# Patient Record
Sex: FEMALE | Race: BLACK OR AFRICAN AMERICAN | HISPANIC OR LATINO
[De-identification: names, ages, dates, MRNs, and addresses within clinical notes are randomized per-mention and may not be internally consistent; named-entity substitution may affect disease eponyms.]

---

## 2018-05-23 ENCOUNTER — APPOINTMENT (OUTPATIENT)
Dept: HUMAN REPRODUCTION | Facility: CLINIC | Age: 38
End: 2018-05-23
Payer: COMMERCIAL

## 2018-05-23 PROCEDURE — 99203 OFFICE O/P NEW LOW 30 MIN: CPT | Mod: 25

## 2018-05-23 PROCEDURE — 36415 COLL VENOUS BLD VENIPUNCTURE: CPT

## 2018-05-30 ENCOUNTER — APPOINTMENT (OUTPATIENT)
Dept: HUMAN REPRODUCTION | Facility: CLINIC | Age: 38
End: 2018-05-30

## 2019-10-15 ENCOUNTER — APPOINTMENT (OUTPATIENT)
Dept: ENDOCRINOLOGY | Facility: CLINIC | Age: 39
End: 2019-10-15
Payer: COMMERCIAL

## 2019-10-15 VITALS
OXYGEN SATURATION: 99 % | SYSTOLIC BLOOD PRESSURE: 90 MMHG | HEIGHT: 56.3 IN | DIASTOLIC BLOOD PRESSURE: 60 MMHG | HEART RATE: 73 BPM | WEIGHT: 109 LBS | BODY MASS INDEX: 24.18 KG/M2

## 2019-10-15 PROCEDURE — 99204 OFFICE O/P NEW MOD 45 MIN: CPT

## 2019-10-20 NOTE — PHYSICAL EXAM
[Well Developed] : well developed [No Respiratory Distress] : no respiratory distress [EOMI] : extra ocular movement intact [No Acute Distress] : no acute distress [Normal Rate and Effort] : normal respiratory rhythm and effort [Clear to Auscultation] : lungs were clear to auscultation bilaterally [Normal S1, S2] : normal S1 and S2 [Normal Rate] : heart rate was normal  [No Motor Deficits] : the motor exam was normal [Normal Gait] : normal gait [Regular Rhythm] : with a regular rhythm [No Tremors] : no tremors [Normal Insight/Judgement] : insight and judgment were intact [Oriented x3] : oriented to person, place, and time [Normal Affect] : the affect was normal

## 2019-10-20 NOTE — ASSESSMENT
[Levothyroxine] : The patient was instructed to take Levothyroxine on an empty stomach, separate from vitamins, and wait at least 30 minutes before eating [FreeTextEntry1] : 39 years old \par PMO- premature ovarian failure\par hypothyroidism \par mosiac's naqvi \par \par \par #hypothyroid \par check TFTS in 4 weeks (she has been on 88mcg for 2 weeks) full effect on TSH to be seen in total 4-6 weeks\par to take on empty stomach\par check antibodies\par \par \par #POF\par check estradiol, LH and FSH \par ovairan antibody\par referral to OBGYN and also to repro obgyn with Dr. Figueroa to discuss fertility options\par \par \par #bone health \par discuss bone health at next visit\par check Vit D today \par with low estrogen state, will affect BMD, optimize jeanne and vit D \par \par \par \par

## 2019-10-20 NOTE — HISTORY OF PRESENT ILLNESS
[FreeTextEntry1] : 39 years old \par PMO- premature ovarian failure\par hypothyroidism \par mosiac's naqvi \par \par \par GYN- to establish care \par well woman exam last year \par low bone denisty -osteopenia - was told to take vit D \par \par 1.thyroid:\par since 24- hypoThyroid \par was on nature throid, TSH 90 in may \par levoxyl 88mcg empty stomach \par 2 weeks \par we discussed at length need for evidence based medicine with levothyroxine and thyroid replacement as well as for other supplements \par explained for bone health vit D and calcium have been studies but vit K for exmaple (which she is taking can lead to issues with bleeding for example) \par explained okay to take MVI and eat well balanced meal \par \par Last TSH in may 2019 \par TSH 90 \par Free T4 0.6\par \par thyroid US: reviewed normal \par \par \par she is also on vit D, magnesium, vit K, \par \par pt with difficulty with fertility \par \par \par PSH\par none \par \par \par Alllergies\par none \par \par \par \par FHX \par mom : hypothythyroid \par \par \par \par SH \par admintrative assistant\par  \par no illicits\par

## 2019-10-21 LAB
25(OH)D3 SERPL-MCNC: 37.3 NG/ML
CHOLEST SERPL-MCNC: 244 MG/DL
CHOLEST/HDLC SERPL: 2.8 RATIO
ESTIMATED AVERAGE GLUCOSE: 114 MG/DL
ESTRADIOL SERPL-MCNC: 8 PG/ML
FSH SERPL-MCNC: 126 IU/L
HBA1C MFR BLD HPLC: 5.6 %
HDLC SERPL-MCNC: 87 MG/DL
LDLC SERPL CALC-MCNC: 148 MG/DL
LH SERPL-ACNC: 56 IU/L
OVARY AB SER QL IF: NEGATIVE
T4 FREE SERPL-MCNC: 1.3 NG/DL
T4 SERPL-MCNC: 6.4 UG/DL
THYROGLOB AB SERPL-ACNC: 2967 IU/ML
THYROPEROXIDASE AB SERPL IA-ACNC: 85.8 IU/ML
TRIGL SERPL-MCNC: 46 MG/DL
TSH SERPL-ACNC: 13.7 UIU/ML
VIT B12 SERPL-MCNC: 1185 PG/ML

## 2019-12-10 ENCOUNTER — LABORATORY RESULT (OUTPATIENT)
Age: 39
End: 2019-12-10

## 2019-12-10 ENCOUNTER — APPOINTMENT (OUTPATIENT)
Dept: ENDOCRINOLOGY | Facility: CLINIC | Age: 39
End: 2019-12-10
Payer: COMMERCIAL

## 2019-12-10 VITALS
HEART RATE: 79 BPM | HEIGHT: 56.3 IN | DIASTOLIC BLOOD PRESSURE: 70 MMHG | SYSTOLIC BLOOD PRESSURE: 110 MMHG | WEIGHT: 107 LBS | BODY MASS INDEX: 23.73 KG/M2 | OXYGEN SATURATION: 98 %

## 2019-12-10 PROCEDURE — 99215 OFFICE O/P EST HI 40 MIN: CPT

## 2019-12-10 NOTE — PHYSICAL EXAM
[No Acute Distress] : no acute distress [Well Developed] : well developed [EOMI] : extra ocular movement intact [Clear to Auscultation] : lungs were clear to auscultation bilaterally [Normal Rate and Effort] : normal respiratory rhythm and effort [No Respiratory Distress] : no respiratory distress [Normal S1, S2] : normal S1 and S2 [Normal Rate] : heart rate was normal  [Regular Rhythm] : with a regular rhythm [Normal Gait] : normal gait [No Motor Deficits] : the motor exam was normal [Oriented x3] : oriented to person, place, and time [Normal Insight/Judgement] : insight and judgment were intact [No Tremors] : no tremors [Normal Affect] : the affect was normal

## 2019-12-11 LAB
ALBUMIN SERPL ELPH-MCNC: 4.8 G/DL
ALP BLD-CCNC: 75 U/L
ALT SERPL-CCNC: 19 U/L
ANION GAP SERPL CALC-SCNC: 15 MMOL/L
AST SERPL-CCNC: 20 U/L
BILIRUB SERPL-MCNC: 0.3 MG/DL
BUN SERPL-MCNC: 11 MG/DL
CALCIUM SERPL-MCNC: 10.2 MG/DL
CHLORIDE SERPL-SCNC: 100 MMOL/L
CO2 SERPL-SCNC: 27 MMOL/L
CREAT SERPL-MCNC: 0.78 MG/DL
GLUCOSE SERPL-MCNC: 87 MG/DL
MAGNESIUM SERPL-MCNC: 2.2 MG/DL
POTASSIUM SERPL-SCNC: 4.3 MMOL/L
PROT SERPL-MCNC: 6.9 G/DL
SODIUM SERPL-SCNC: 142 MMOL/L
TSH SERPL-ACNC: 10.9 UIU/ML

## 2019-12-11 NOTE — ASSESSMENT
[Levothyroxine] : The patient was instructed to take Levothyroxine on an empty stomach, separate from vitamins, and wait at least 30 minutes before eating [FreeTextEntry1] : 39 years old \par POF premature ovarian failure\par hypothyroidism \par mosiac's naqvi \par HLD\par ?osteopenia/low bone density in the past \par \par \par long discussion today as well as last visit to discuss that many supplements that she is taking are not evidience based and studied and that may be more harmful to her health (iodiene for example can lead to flututating thyroid levels and hypothyroidism and will be harder to adjust levoT doses)\par \par #hypothyroid \par + antibodies\par check TSH\par long discussion with the pt to avoid supplements such as iodeine which can hinder and affect her thyroid levels \par \par #POF\par ovairan antibody negative\par referral to OBGYN and also to repro obgyn with Dr. Figueroa to discuss fertility options\par d/w pt she will need estrogen replacment tx to help with bone health and will also need to be followed with GYN while on replacement to follow\par \par \par #bone health  \par with low estrogen state, will affect BMD, optimize jeanne and vit D \par we discussed calcium 600mg thru diet and 600mg thru supplement \par continue vitamin D 1000 units \par bone density to assess her bone health\par \par \par \par #HLD\par diet and exercise\par low fat diet\par repeat Lipid panel today

## 2019-12-11 NOTE — HISTORY OF PRESENT ILLNESS
[FreeTextEntry1] : 39 years old \par PMO- premature ovarian failure\par hypothyroidism \par moswendy's naqvi -low estrogen elevated FSH/LH, consistent with POF\par \par here for followup, here with  whom she is okay to have in the room\par \par 12/10- went [place] [facility] \par \par \par 1.thyroid:\par since 24- hypoThyroid \par +antobodies \par was on nature throid, TSH 90 in may \par levoxyl 88mcg empty stomach --> TSH 13--> at that time on LevoT 88mcg, we asked to repeat TFTS in total of 4 weeks of [id number]\par we discussed at length need for evidence based medicine with levothyroxine and thyroid replacement as well as for other supplements \par explained okay to take MVI and eat well balanced meal \par  TSH in may 2019 \par TSH 90 \par Free T4 0.6\par \par thyroid US: reviewed normal \par \par Now on 88 mcg levothyroxine empty stomach TSH 13 in oct \par she is also taking iodiene, we had a LONG discussion about SE and risks of taking excess iodenie and this can in fact lead to hypothyroidism as well via wolfchaikoff effect\par we discussed at length need for evidence based medicine with levothyroxine and thyroid replacement as well as for other supplements \par explained okay to take MVI and eat well balanced meal and to be careful with taking supplments that are not FDA approved \par \par \par #HLD\par LDL\par  \par cholesterol 244\par no fried foods \par + cheese\par \par \par \par #vitamin D\par level was 37\par vitamin D 1000 units \par calcium - greens \par takes magnesium \par was told she had osteopenia in the past \par \par \par take \par vtiamin D \par prenatal \par magneium \par potassium \par DHEAS \par iodenie \par \par \par pt with difficulty with fertility \par \par \par \par PSH\par none \par \par \par Alllergies\par none \par \par \par \par FHX \par mom : hypothythyroid \par \par \par \par SH \par admintrative assistant\par  \par no illicits\par

## 2019-12-12 ENCOUNTER — CLINICAL ADVICE (OUTPATIENT)
Age: 39
End: 2019-12-12

## 2020-01-27 ENCOUNTER — APPOINTMENT (OUTPATIENT)
Dept: HUMAN REPRODUCTION | Facility: CLINIC | Age: 40
End: 2020-01-27
Payer: COMMERCIAL

## 2020-01-27 PROCEDURE — 99205 OFFICE O/P NEW HI 60 MIN: CPT | Mod: 25

## 2020-01-27 PROCEDURE — 76830 TRANSVAGINAL US NON-OB: CPT

## 2020-01-28 ENCOUNTER — APPOINTMENT (OUTPATIENT)
Dept: HUMAN REPRODUCTION | Facility: CLINIC | Age: 40
End: 2020-01-28

## 2020-01-30 ENCOUNTER — APPOINTMENT (OUTPATIENT)
Dept: RADIOLOGY | Facility: IMAGING CENTER | Age: 40
End: 2020-01-30

## 2020-01-30 ENCOUNTER — OUTPATIENT (OUTPATIENT)
Dept: OUTPATIENT SERVICES | Facility: HOSPITAL | Age: 40
LOS: 1 days | End: 2020-01-30
Payer: COMMERCIAL

## 2020-01-30 DIAGNOSIS — Z00.8 ENCOUNTER FOR OTHER GENERAL EXAMINATION: ICD-10-CM

## 2020-01-30 PROCEDURE — 77080 DXA BONE DENSITY AXIAL: CPT | Mod: 26

## 2020-01-30 PROCEDURE — 77080 DXA BONE DENSITY AXIAL: CPT

## 2020-02-10 ENCOUNTER — APPOINTMENT (OUTPATIENT)
Dept: PEDIATRIC MEDICAL GENETICS | Facility: CLINIC | Age: 40
End: 2020-02-10

## 2020-03-02 ENCOUNTER — APPOINTMENT (OUTPATIENT)
Dept: ANTEPARTUM | Facility: CLINIC | Age: 40
End: 2020-03-02

## 2020-03-02 ENCOUNTER — APPOINTMENT (OUTPATIENT)
Dept: MATERNAL FETAL MEDICINE | Facility: CLINIC | Age: 40
End: 2020-03-02

## 2020-03-09 ENCOUNTER — APPOINTMENT (OUTPATIENT)
Dept: CV DIAGNOSITCS | Facility: HOSPITAL | Age: 40
End: 2020-03-09

## 2020-03-09 ENCOUNTER — OUTPATIENT (OUTPATIENT)
Dept: OUTPATIENT SERVICES | Facility: HOSPITAL | Age: 40
LOS: 1 days | End: 2020-03-09
Payer: COMMERCIAL

## 2020-03-09 DIAGNOSIS — I25.10 ATHEROSCLEROTIC HEART DISEASE OF NATIVE CORONARY ARTERY WITHOUT ANGINA PECTORIS: ICD-10-CM

## 2020-03-09 PROCEDURE — 93306 TTE W/DOPPLER COMPLETE: CPT

## 2020-03-09 PROCEDURE — 93306 TTE W/DOPPLER COMPLETE: CPT | Mod: 26

## 2020-03-10 ENCOUNTER — APPOINTMENT (OUTPATIENT)
Dept: HUMAN REPRODUCTION | Facility: CLINIC | Age: 40
End: 2020-03-10

## 2020-03-11 ENCOUNTER — APPOINTMENT (OUTPATIENT)
Dept: ENDOCRINOLOGY | Facility: CLINIC | Age: 40
End: 2020-03-11
Payer: COMMERCIAL

## 2020-03-11 VITALS
WEIGHT: 106 LBS | DIASTOLIC BLOOD PRESSURE: 72 MMHG | HEART RATE: 77 BPM | SYSTOLIC BLOOD PRESSURE: 110 MMHG | HEIGHT: 56.3 IN | BODY MASS INDEX: 23.51 KG/M2 | OXYGEN SATURATION: 98 %

## 2020-03-11 PROCEDURE — 99214 OFFICE O/P EST MOD 30 MIN: CPT

## 2020-03-11 NOTE — PHYSICAL EXAM
[No Acute Distress] : no acute distress [Well Developed] : well developed [EOMI] : extra ocular movement intact [No Respiratory Distress] : no respiratory distress [Normal Rate and Effort] : normal respiratory rhythm and effort [Clear to Auscultation] : lungs were clear to auscultation bilaterally [Normal Rate] : heart rate was normal  [Normal S1, S2] : normal S1 and S2 [Regular Rhythm] : with a regular rhythm [Normal Gait] : normal gait [No Motor Deficits] : the motor exam was normal [No Tremors] : no tremors [Oriented x3] : oriented to person, place, and time [Normal Insight/Judgement] : insight and judgment were intact [Normal Affect] : the affect was normal

## 2020-03-18 RX ORDER — LEVOTHYROXINE SODIUM 0.1 MG/1
100 TABLET ORAL
Qty: 30 | Refills: 2 | Status: COMPLETED | COMMUNITY
End: 2020-03-18

## 2020-03-18 NOTE — ASSESSMENT
[Levothyroxine] : The patient was instructed to take Levothyroxine on an empty stomach, separate from vitamins, and wait at least 30 minutes before eating [FreeTextEntry1] : 39 years old \par POF premature ovarian failure\par hypothyroidism \par mosiac's naqvi \par HLD\par ?osteopenia/low bone density in the past \par \par \par needs to have PCP- will refer \par start back vitamin D \par need to get bone density results to us \par \par \par #hypothyroid \par + antibodies\par check TSH\par long discussion with the pt to avoid supplements such as iodeine which can hinder and affect her thyroid levels\par continue 100mcg daily for now, \par \par #POF\par ovairan antibody negative\par referral to OBGYN \par d/w pt she will need estrogen replacment tx to help with bone health and will also need to be followed with GYN while on replacement to follow (may need llower doses given age and not OCP doses)\par \par \par #bone health  \par with low estrogen state, will affect BMD, optimize jeanne and vit D \par we discussed calcium 600mg thru diet and 600mg thru supplement \par continue vitamin D 1000 units \par bone density to assess her bone health- she had this done recently. i have asked the pt to get us the results as well \par \par \par \par #HLD\par diet and exercise\par low fat diet\par repeat Lipid panel today

## 2020-03-18 NOTE — HISTORY OF PRESENT ILLNESS
[FreeTextEntry1] : 39 years old \par PMO- premature ovarian failure\par hypothyroidism \par floyd's naqvi -low estrogen elevated FSH/LH, consistent with POF\par \par 3/11- she recently lost her GM, is upset about this.\par \par \par 1.thyroid:\par since 24- hypoThyroid \par +antobodies \par was on nature throid, TSH 90 in may \par levoxyl 88mcg empty stomach --> TSH 13--> at that time on LevoT 88mcg, we asked to repeat TFTS in total of 4 weeks of [id number]\par we discussed at length need for evidence based medicine with levothyroxine and thyroid replacement as well as for other supplements \par explained okay to take MVI and eat well balanced meal \par TSH in may 2019 \par TSH 90 \par Free T4 0.6\par \par thyroid US: reviewed normal \par \par than on 88 mcg levothyroxine empty stomach TSH 13 in oct ---->10\par she is also taking iodiene, we had a LONG discussion about SE and risks of taking excess iodenie and this can in fact lead to hypothyroidism as well via wolfchaikoff effect\par we had discussed at length need for evidence based medicine with levothyroxine and thyroid replacement as well as for other supplements \par explained okay to take MVI and eat well balanced meal and to be careful with taking supplments that are not FDA approved \par \par \par she was than increased to 100mcg - last TSH 2.71 \par stopped all the replacements \par \par #HLD\par LDL\par  \par cholesterol 244\par no fried foods \par + cheese\par \par \par \par #vitamin D\par level was 37-\par vitamin D 1000 units (stopped it)\par bone health \par add thru the diet - calcium \par calcium - greens \par takes magnesium \par was told she had osteopenia in the past \par she had a bone density done- d/w pt to have this sent to us to review \par \par \par \par \par \par PSH\par none \par \par \par Alllergies\par none \par \par \par \par FHX \par mom : hypothythyroid \par \par \par \par SH \par admintrative assistant\par  \par no illicits\par

## 2020-04-01 LAB
CHOLEST SERPL-MCNC: 237 MG/DL
CHOLEST/HDLC SERPL: 2.7 RATIO
ESTIMATED AVERAGE GLUCOSE: 114 MG/DL
HBA1C MFR BLD HPLC: 5.6 %
HDLC SERPL-MCNC: 87 MG/DL
LDLC SERPL CALC-MCNC: 132 MG/DL
TRIGL SERPL-MCNC: 87 MG/DL
TSH SERPL-ACNC: 1.25 UIU/ML

## 2020-05-28 ENCOUNTER — FORM ENCOUNTER (OUTPATIENT)
Age: 40
End: 2020-05-28

## 2020-05-29 ENCOUNTER — RESULT REVIEW (OUTPATIENT)
Age: 40
End: 2020-05-29

## 2020-05-29 ENCOUNTER — APPOINTMENT (OUTPATIENT)
Dept: OBGYN | Facility: CLINIC | Age: 40
End: 2020-05-29
Payer: COMMERCIAL

## 2020-05-29 PROCEDURE — 99213 OFFICE O/P EST LOW 20 MIN: CPT | Mod: 25

## 2020-05-29 PROCEDURE — 36415 COLL VENOUS BLD VENIPUNCTURE: CPT

## 2020-05-29 PROCEDURE — 99386 PREV VISIT NEW AGE 40-64: CPT

## 2020-06-01 ENCOUNTER — FORM ENCOUNTER (OUTPATIENT)
Age: 40
End: 2020-06-01

## 2020-06-12 ENCOUNTER — APPOINTMENT (OUTPATIENT)
Dept: ULTRASOUND IMAGING | Facility: CLINIC | Age: 40
End: 2020-06-12
Payer: COMMERCIAL

## 2020-06-12 ENCOUNTER — RESULT REVIEW (OUTPATIENT)
Age: 40
End: 2020-06-12

## 2020-06-12 ENCOUNTER — OUTPATIENT (OUTPATIENT)
Dept: OUTPATIENT SERVICES | Facility: HOSPITAL | Age: 40
LOS: 1 days | End: 2020-06-12
Payer: COMMERCIAL

## 2020-06-12 DIAGNOSIS — Z00.8 ENCOUNTER FOR OTHER GENERAL EXAMINATION: ICD-10-CM

## 2020-06-12 PROCEDURE — 76830 TRANSVAGINAL US NON-OB: CPT

## 2020-06-12 PROCEDURE — 76830 TRANSVAGINAL US NON-OB: CPT | Mod: 26

## 2020-06-12 PROCEDURE — 76856 US EXAM PELVIC COMPLETE: CPT | Mod: 26

## 2020-06-12 PROCEDURE — 76856 US EXAM PELVIC COMPLETE: CPT

## 2020-06-15 ENCOUNTER — FORM ENCOUNTER (OUTPATIENT)
Age: 40
End: 2020-06-15

## 2020-06-16 ENCOUNTER — TRANSCRIPTION ENCOUNTER (OUTPATIENT)
Age: 40
End: 2020-06-16

## 2020-06-22 ENCOUNTER — APPOINTMENT (OUTPATIENT)
Dept: ENDOCRINOLOGY | Facility: CLINIC | Age: 40
End: 2020-06-22

## 2020-06-23 ENCOUNTER — APPOINTMENT (OUTPATIENT)
Dept: MAMMOGRAPHY | Facility: CLINIC | Age: 40
End: 2020-06-23

## 2020-06-23 ENCOUNTER — APPOINTMENT (OUTPATIENT)
Dept: ULTRASOUND IMAGING | Facility: CLINIC | Age: 40
End: 2020-06-23

## 2020-07-10 ENCOUNTER — APPOINTMENT (OUTPATIENT)
Dept: INTERNAL MEDICINE | Facility: CLINIC | Age: 40
End: 2020-07-10
Payer: COMMERCIAL

## 2020-07-10 ENCOUNTER — APPOINTMENT (OUTPATIENT)
Dept: OBGYN | Facility: CLINIC | Age: 40
End: 2020-07-10
Payer: COMMERCIAL

## 2020-07-10 ENCOUNTER — NON-APPOINTMENT (OUTPATIENT)
Age: 40
End: 2020-07-10

## 2020-07-10 VITALS — SYSTOLIC BLOOD PRESSURE: 110 MMHG | DIASTOLIC BLOOD PRESSURE: 72 MMHG

## 2020-07-10 VITALS
BODY MASS INDEX: 21.57 KG/M2 | OXYGEN SATURATION: 99 % | HEIGHT: 57 IN | HEART RATE: 56 BPM | WEIGHT: 100 LBS | TEMPERATURE: 98.5 F

## 2020-07-10 DIAGNOSIS — Z83.49 FAMILY HISTORY OF OTHER ENDOCRINE, NUTRITIONAL AND METABOLIC DISEASES: ICD-10-CM

## 2020-07-10 PROCEDURE — 81003 URINALYSIS AUTO W/O SCOPE: CPT | Mod: QW

## 2020-07-10 PROCEDURE — 99212 OFFICE O/P EST SF 10 MIN: CPT | Mod: 95

## 2020-07-10 PROCEDURE — 99386 PREV VISIT NEW AGE 40-64: CPT | Mod: 25

## 2020-07-10 PROCEDURE — 36415 COLL VENOUS BLD VENIPUNCTURE: CPT

## 2020-07-10 PROCEDURE — 93000 ELECTROCARDIOGRAM COMPLETE: CPT

## 2020-07-10 NOTE — PHYSICAL EXAM
[No Acute Distress] : no acute distress [Well Nourished] : well nourished [Normal Sclera/Conjunctiva] : normal sclera/conjunctiva [PERRL] : pupils equal round and reactive to light [Normal Outer Ear/Nose] : the outer ears and nose were normal in appearance [No JVD] : no jugular venous distention [Normal Oropharynx] : the oropharynx was normal [EOMI] : extraocular movements intact [Supple] : supple [No Lymphadenopathy] : no lymphadenopathy [No Accessory Muscle Use] : no accessory muscle use [Clear to Auscultation] : lungs were clear to auscultation bilaterally [No Respiratory Distress] : no respiratory distress  [Normal Rate] : normal rate  [Normal S1, S2] : normal S1 and S2 [Regular Rhythm] : with a regular rhythm [No Carotid Bruits] : no carotid bruits [No Edema] : there was no peripheral edema [No Palpable Aorta] : no palpable aorta [No Extremity Clubbing/Cyanosis] : no extremity clubbing/cyanosis [Declined Breast Exam] : declined breast exam  [Soft] : abdomen soft [Non Tender] : non-tender [Non-distended] : non-distended [Declined Rectal Exam] : declined rectal exam [No Masses] : no abdominal mass palpated [Normal Posterior Cervical Nodes] : no posterior cervical lymphadenopathy [Normal Anterior Cervical Nodes] : no anterior cervical lymphadenopathy [No Spinal Tenderness] : no spinal tenderness [No CVA Tenderness] : no CVA  tenderness [No Joint Swelling] : no joint swelling [Grossly Normal Strength/Tone] : grossly normal strength/tone [Coordination Grossly Intact] : coordination grossly intact [No Rash] : no rash [No Focal Deficits] : no focal deficits [Normal Gait] : normal gait [Normal Insight/Judgement] : insight and judgment were intact [Normal Affect] : the affect was normal

## 2020-07-10 NOTE — REVIEW OF SYSTEMS
[Chills] : no chills [Discharge] : no discharge [Fever] : no fever [Itching] : no itching [Pain] : no pain [Earache] : no earache [Hearing Loss] : no hearing loss [Sore Throat] : no sore throat [Nasal Discharge] : no nasal discharge [Palpitations] : no palpitations [Shortness Of Breath] : no shortness of breath [Wheezing] : no wheezing [Lower Ext Edema] : no lower extremity edema [Cough] : no cough [Abdominal Pain] : no abdominal pain [Nausea] : no nausea [Dysuria] : no dysuria [Heartburn] : no heartburn [Incontinence] : no incontinence [Hematuria] : no hematuria [Joint Stiffness] : no joint stiffness [Joint Swelling] : no joint swelling [Back Pain] : no back pain [Mole Changes] : no mole changes [Headache] : no headache [Skin Rash] : no skin rash [Confusion] : no confusion [Fainting] : no fainting [Dizziness] : no dizziness [Unsteady Walking] : no ataxia [Anxiety] : no anxiety [Easy Bruising] : no easy bruising [Easy Bleeding] : no easy bleeding

## 2020-07-13 LAB
25(OH)D3 SERPL-MCNC: 58.2 NG/ML
ALBUMIN SERPL ELPH-MCNC: 4.6 G/DL
ALP BLD-CCNC: 72 U/L
ALT SERPL-CCNC: 15 U/L
ANION GAP SERPL CALC-SCNC: 18 MMOL/L
AST SERPL-CCNC: 24 U/L
BASOPHILS # BLD AUTO: 0.01 K/UL
BASOPHILS NFR BLD AUTO: 0.2 %
BILIRUB SERPL-MCNC: <0.2 MG/DL
BILIRUB UR QL STRIP: NEGATIVE
BUN SERPL-MCNC: 8 MG/DL
CALCIUM SERPL-MCNC: 9.5 MG/DL
CHLORIDE SERPL-SCNC: 105 MMOL/L
CHOLEST SERPL-MCNC: 201 MG/DL
CHOLEST/HDLC SERPL: 2.8 RATIO
CLARITY UR: CLEAR
CO2 SERPL-SCNC: 21 MMOL/L
COLLECTION METHOD: NORMAL
CREAT SERPL-MCNC: 0.72 MG/DL
EOSINOPHIL # BLD AUTO: 0.1 K/UL
EOSINOPHIL NFR BLD AUTO: 2 %
GLUCOSE SERPL-MCNC: 87 MG/DL
GLUCOSE UR-MCNC: NEGATIVE
HCG UR QL: 0.2 EU/DL
HCT VFR BLD CALC: 44.2 %
HDLC SERPL-MCNC: 72 MG/DL
HGB BLD-MCNC: 13.8 G/DL
HGB UR QL STRIP.AUTO: NEGATIVE
IMM GRANULOCYTES NFR BLD AUTO: 0.4 %
KETONES UR-MCNC: NEGATIVE
LDLC SERPL CALC-MCNC: 106 MG/DL
LEUKOCYTE ESTERASE UR QL STRIP: NEGATIVE
LYMPHOCYTES # BLD AUTO: 1.65 K/UL
LYMPHOCYTES NFR BLD AUTO: 33.6 %
MAN DIFF?: NORMAL
MCHC RBC-ENTMCNC: 30.4 PG
MCHC RBC-ENTMCNC: 31.2 GM/DL
MCV RBC AUTO: 97.4 FL
MONOCYTES # BLD AUTO: 0.37 K/UL
MONOCYTES NFR BLD AUTO: 7.5 %
NEUTROPHILS # BLD AUTO: 2.76 K/UL
NEUTROPHILS NFR BLD AUTO: 56.3 %
NITRITE UR QL STRIP: NEGATIVE
PH UR STRIP: 7.5
PLATELET # BLD AUTO: 234 K/UL
POTASSIUM SERPL-SCNC: 4.3 MMOL/L
PROT SERPL-MCNC: 6.7 G/DL
PROT UR STRIP-MCNC: NEGATIVE
RBC # BLD: 4.54 M/UL
RBC # FLD: 13.7 %
SODIUM SERPL-SCNC: 144 MMOL/L
SP GR UR STRIP: 1.02
T4 FREE SERPL-MCNC: 1.4 NG/DL
TRIGL SERPL-MCNC: 111 MG/DL
TSH SERPL-ACNC: 1.86 UIU/ML
WBC # FLD AUTO: 4.91 K/UL

## 2020-07-17 ENCOUNTER — APPOINTMENT (OUTPATIENT)
Dept: ENDOCRINOLOGY | Facility: CLINIC | Age: 40
End: 2020-07-17
Payer: COMMERCIAL

## 2020-07-17 VITALS
DIASTOLIC BLOOD PRESSURE: 62 MMHG | BODY MASS INDEX: 20.49 KG/M2 | HEART RATE: 65 BPM | TEMPERATURE: 98.1 F | OXYGEN SATURATION: 98 % | HEIGHT: 57 IN | WEIGHT: 95 LBS | SYSTOLIC BLOOD PRESSURE: 100 MMHG

## 2020-07-17 PROCEDURE — 99214 OFFICE O/P EST MOD 30 MIN: CPT

## 2020-07-20 NOTE — HISTORY OF PRESENT ILLNESS
[FreeTextEntry1] : 40 years old \par PMO- premature ovarian failure\par hypothyroidism \par floyd's naqvi -low estrogen elevated FSH/LH, consistent with POF\par \par 3/11- she recently lost her GM, is upset about this.\par \par \par 1.thyroid:\par since 24- hypoThyroid \par +antobodies \par was on nature throid, TSH 90 in may \par levoxyl 88mcg empty stomach --> TSH 13--> at that time on LevoT 88mcg, we asked to repeat TFTS in total of 4 weeks of [id number]\par we discussed at length need for evidence based medicine with levothyroxine and thyroid replacement as well as for other supplements \par explained okay to take MVI and eat well balanced meal \par TSH in may 2019 \par TSH 90 \par Free T4 0.6\par \par thyroid US: reviewed normal \par \par than on 88 mcg levothyroxine empty stomach TSH 13 in oct ---->10\par she is also taking iodiene, we had a LONG discussion about SE and risks of taking excess iodenie and this can in fact lead to hypothyroidism as well via wolfchaikoff effect\par we had discussed at length need for evidence based medicine with levothyroxine and thyroid replacement as well as for other supplements \par explained okay to take MVI and eat well balanced meal and to be careful with taking supplments that are not FDA approved \par she was than increased to 100mcg - last TSH 2.71 \par stopped all the replacements \par \par \par most recent TSH 1.86 Jyuly 2020\par \par #HLD\par LDL\par  --->106 july 2020\par cholesterol 244\par no fried foods \par + cheese\par \par \par \par #vitamin D\par vitamin D 1000 units \par bone health \par add thru the diet - calcium \par calcium - greens \par takes magnesium \par was told she had osteopenia in the past \par now with osteoporosis, spine -2.9 \par she started estrogen now.\par we do no have the bone density report but now is on estrgeon with OBGYN\par \par \par \par \par PSH\par none \par \par \par Alllergies\par none \par \par \par \par FHX \par mom : hypothythyroid \par \par \par \par SH \par admintrative assistant\par  \par no illicits\par

## 2020-07-20 NOTE — ASSESSMENT
[Levothyroxine] : The patient was instructed to take Levothyroxine on an empty stomach, separate from vitamins, and wait at least 30 minutes before eating [FreeTextEntry1] : 40 years old \par POF premature ovarian failure\par hypothyroidism \par mosiac's naqvi \par HLD\par ?osteopenia/low bone density in the past \par \par \par \par #hypothyroid \par + antibodies\par TSH normal at goal \par long discussion with the pt to avoid supplements such as iodeine which can hinder and affect her thyroid levels\par continue 100mcg daily for now, \par \par #POF\par ovairan antibody negative\par now on estrogen replacment tx to help with bone health and will also need to be followed with GYN while on replacement to follow \par \par \par #bone health  \par with low estrogen state, will affect BMD, optimize jeanne and vit D \par we discussed calcium 600mg thru diet and 600mg thru supplement \par continue vitamin D 1000 units \par spine -2.9\par total hip -1.8\par fem neck -2.1\par \par \par #HLD\par diet and exercise\par low fat diet\par  today, improved\par \par \par pt also should followup with PCP regarding ongoing medical care and followup of medical issues/concerns and exam\par

## 2020-07-20 NOTE — PHYSICAL EXAM
[Alert] : alert [No Respiratory Distress] : no respiratory distress [No Acute Distress] : no acute distress [Normal PMI] : the apical impulse was normal [Normal S1, S2] : normal S1 and S2 [No Accessory Muscle Use] : no accessory muscle use [Normal Bowel Sounds] : normal bowel sounds [Normal Rate] : heart rate was normal [Not Tender] : non-tender [Soft] : abdomen soft [Normal Gait] : normal gait [Normal Affect] : the affect was normal [Normal Insight/Judgement] : insight and judgment were intact [Normal Mood] : the mood was normal

## 2020-07-24 ENCOUNTER — APPOINTMENT (OUTPATIENT)
Dept: ULTRASOUND IMAGING | Facility: CLINIC | Age: 40
End: 2020-07-24
Payer: COMMERCIAL

## 2020-07-24 ENCOUNTER — OUTPATIENT (OUTPATIENT)
Dept: OUTPATIENT SERVICES | Facility: HOSPITAL | Age: 40
LOS: 1 days | End: 2020-07-24
Payer: COMMERCIAL

## 2020-07-24 ENCOUNTER — APPOINTMENT (OUTPATIENT)
Dept: MAMMOGRAPHY | Facility: CLINIC | Age: 40
End: 2020-07-24
Payer: COMMERCIAL

## 2020-07-24 ENCOUNTER — RESULT REVIEW (OUTPATIENT)
Age: 40
End: 2020-07-24

## 2020-07-24 DIAGNOSIS — Z12.31 ENCOUNTER FOR SCREENING MAMMOGRAM FOR MALIGNANT NEOPLASM OF BREAST: ICD-10-CM

## 2020-07-24 DIAGNOSIS — Z00.8 ENCOUNTER FOR OTHER GENERAL EXAMINATION: ICD-10-CM

## 2020-07-24 PROCEDURE — 77063 BREAST TOMOSYNTHESIS BI: CPT | Mod: 26

## 2020-07-24 PROCEDURE — 77063 BREAST TOMOSYNTHESIS BI: CPT

## 2020-07-24 PROCEDURE — 77067 SCR MAMMO BI INCL CAD: CPT | Mod: 26

## 2020-07-24 PROCEDURE — 76641 ULTRASOUND BREAST COMPLETE: CPT | Mod: 26,50

## 2020-07-24 PROCEDURE — 76641 ULTRASOUND BREAST COMPLETE: CPT

## 2020-07-24 PROCEDURE — 77067 SCR MAMMO BI INCL CAD: CPT

## 2020-08-17 ENCOUNTER — FORM ENCOUNTER (OUTPATIENT)
Age: 40
End: 2020-08-17

## 2020-10-16 ENCOUNTER — APPOINTMENT (OUTPATIENT)
Dept: INTERNAL MEDICINE | Facility: CLINIC | Age: 40
End: 2020-10-16
Payer: COMMERCIAL

## 2020-10-16 VITALS
SYSTOLIC BLOOD PRESSURE: 122 MMHG | WEIGHT: 96 LBS | TEMPERATURE: 97.4 F | BODY MASS INDEX: 20.71 KG/M2 | HEART RATE: 52 BPM | RESPIRATION RATE: 17 BRPM | DIASTOLIC BLOOD PRESSURE: 77 MMHG | HEIGHT: 57 IN | OXYGEN SATURATION: 100 %

## 2020-10-16 DIAGNOSIS — Z82.49 FAMILY HISTORY OF ISCHEMIC HEART DISEASE AND OTHER DISEASES OF THE CIRCULATORY SYSTEM: ICD-10-CM

## 2020-10-16 PROCEDURE — 99214 OFFICE O/P EST MOD 30 MIN: CPT

## 2020-10-18 PROBLEM — Z82.49 FAMILY HISTORY OF HYPERTENSION: Status: ACTIVE | Noted: 2020-10-16

## 2021-01-15 ENCOUNTER — RX RENEWAL (OUTPATIENT)
Age: 41
End: 2021-01-15

## 2021-02-11 ENCOUNTER — APPOINTMENT (OUTPATIENT)
Dept: ENDOCRINOLOGY | Facility: CLINIC | Age: 41
End: 2021-02-11
Payer: COMMERCIAL

## 2021-03-19 ENCOUNTER — APPOINTMENT (OUTPATIENT)
Dept: ENDOCRINOLOGY | Facility: CLINIC | Age: 41
End: 2021-03-19

## 2021-03-19 PROCEDURE — 99213 OFFICE O/P EST LOW 20 MIN: CPT | Mod: 95

## 2021-03-25 NOTE — HISTORY OF PRESENT ILLNESS
[Home] : at home, [unfilled] , at the time of the visit. [Medical Office: ([facility])___] : at the medical office located in  [FreeTextEntry1] : 40 years old \par PMO- premature ovarian failure\par hypothyroidism \par mosiac's naqvi -low estrogen elevated FSH/LH, consistent with POF\par \par \par \par \par 1.thyroid:\par since 24- hypoThyroid \par +antobodies \par was on nature throid, TSH 90 in may \par levoxyl 88mcg empty stomach --> TSH 13--> at that time on LevoT 88mcg, we asked to repeat TFTS in total of 4 weeks of [id number]\par we discussed at length need for evidence based medicine with levothyroxine and thyroid replacement as well as for other supplements \par explained okay to take MVI and eat well balanced meal \par TSH in may 2019 \par TSH 90 \par Free T4 0.6\par \par thyroid US: reviewed normal \par \par than on 88 mcg levothyroxine empty stomach TSH 13 in oct ---->10\par she was also taking iodiene, we had a LONG discussion about SE and risks of taking excess iodenie and this can in fact lead to hypothyroidism as well via wolfchaikoff effect\par we had discussed at length need for evidence based medicine with levothyroxine and thyroid replacement as well as for other supplements \par explained okay to take MVI and eat well balanced meal and to be careful with taking supplments that are not FDA approved \par she was than increased to 100mcg - last TSH 2.71 \par stopped all the replacements \par most recent TSH 1.86 Jyuly 2020\par \par \par she currentyl feels well on this dose\par does not endorse hypothyroid or hyperthyroid symtoms \par \par \par #HLD\par LDL\par  --->106 july 2020\par cholesterol 244\par no fried foods \par \par \par \par \par #vitamin D\par vitamin D 1000 units \par bone health \par add thru the diet - calcium \par calcium - greens \par takes magnesium \par was told she had osteopenia in the past \par now with osteoporosis, spine -2.9 , fem neck -2.1, and total hip -1.8\par she started estrogen now.\par \par \par \par \par \par PSH\par none \par \par \par Alllergies\par none \par \par \par \par FHX \par mom : hypothythyroid \par \par \par \par SH \par admintrative assistant\par  \par no illicits\par

## 2021-03-25 NOTE — ASSESSMENT
[FreeTextEntry1] : 40 years old \par POF premature ovarian failure\par hypothyroidism \par mosiac's naqvi \par HLD\par ?osteoporosis on DXA \par \par \par \par #hypothyroid \par + antibodies\par TSH normal at goal \par long discussion with the pt to avoid supplements such as iodeine which can hinder and affect her thyroid levels\par continue 100mcg daily for now, \par check TSH \par \par \par #POF\par ovairan antibody negative\par now on estrogen replacment tx to help with bone health and will also need to be followed with GYN while on replacement to follow \par \par \par #bone health  \par with low estrogen state, will affect BMD, optimize jeanne and vit D \par we discussed calcium 600mg thru diet and 600mg thru supplement \par continue vitamin D 1000 units \par spine -2.9\par total hip -1.8\par fem neck -2.1\par check CMP and vitamin D levels \par \par \par #HLD\par diet and exercise\par low fat diet\par follow with pcp \par \par \par pt also should followup with PCP regarding ongoing medical care and followup of medical issues/concerns and exam\par  [Levothyroxine] : The patient was instructed to take Levothyroxine on an empty stomach, separate from vitamins, and wait at least 30 minutes before eating

## 2021-03-25 NOTE — PHYSICAL EXAM
[Alert] : alert [No Acute Distress] : no acute distress [No Respiratory Distress] : no respiratory distress [Normal Affect] : the affect was normal [Normal Insight/Judgement] : insight and judgment were intact [Normal Mood] : the mood was normal

## 2021-04-26 ENCOUNTER — NON-APPOINTMENT (OUTPATIENT)
Age: 41
End: 2021-04-26

## 2021-04-26 ENCOUNTER — APPOINTMENT (OUTPATIENT)
Dept: OPHTHALMOLOGY | Facility: CLINIC | Age: 41
End: 2021-04-26
Payer: COMMERCIAL

## 2021-04-26 PROCEDURE — 92004 COMPRE OPH EXAM NEW PT 1/>: CPT

## 2021-04-26 PROCEDURE — 99072 ADDL SUPL MATRL&STAF TM PHE: CPT

## 2021-05-21 ENCOUNTER — APPOINTMENT (OUTPATIENT)
Dept: INTERNAL MEDICINE | Facility: CLINIC | Age: 41
End: 2021-05-21
Payer: COMMERCIAL

## 2021-05-21 VITALS
TEMPERATURE: 98 F | DIASTOLIC BLOOD PRESSURE: 75 MMHG | HEART RATE: 75 BPM | WEIGHT: 100 LBS | RESPIRATION RATE: 17 BRPM | SYSTOLIC BLOOD PRESSURE: 112 MMHG | HEIGHT: 57 IN | OXYGEN SATURATION: 100 % | BODY MASS INDEX: 21.57 KG/M2

## 2021-05-21 DIAGNOSIS — Z11.59 ENCOUNTER FOR SCREENING FOR OTHER VIRAL DISEASES: ICD-10-CM

## 2021-05-21 PROCEDURE — 99072 ADDL SUPL MATRL&STAF TM PHE: CPT

## 2021-05-21 PROCEDURE — 99396 PREV VISIT EST AGE 40-64: CPT

## 2021-05-25 ENCOUNTER — APPOINTMENT (OUTPATIENT)
Dept: RADIOLOGY | Facility: CLINIC | Age: 41
End: 2021-05-25

## 2021-05-28 ENCOUNTER — APPOINTMENT (OUTPATIENT)
Dept: RADIOLOGY | Facility: CLINIC | Age: 41
End: 2021-05-28
Payer: COMMERCIAL

## 2021-05-28 ENCOUNTER — RESULT REVIEW (OUTPATIENT)
Age: 41
End: 2021-05-28

## 2021-05-28 ENCOUNTER — APPOINTMENT (OUTPATIENT)
Dept: MAMMOGRAPHY | Facility: CLINIC | Age: 41
End: 2021-05-28
Payer: COMMERCIAL

## 2021-05-28 PROCEDURE — 77063 BREAST TOMOSYNTHESIS BI: CPT

## 2021-05-28 PROCEDURE — 77067 SCR MAMMO BI INCL CAD: CPT

## 2021-05-28 PROCEDURE — 71046 X-RAY EXAM CHEST 2 VIEWS: CPT

## 2021-06-02 LAB
25(OH)D3 SERPL-MCNC: 88.9 NG/ML
ALBUMIN SERPL ELPH-MCNC: 4.8 G/DL
ALP BLD-CCNC: 85 U/L
ALT SERPL-CCNC: 25 U/L
ANION GAP SERPL CALC-SCNC: 12 MMOL/L
AST SERPL-CCNC: 35 U/L
BASOPHILS # BLD AUTO: 0.01 K/UL
BASOPHILS NFR BLD AUTO: 0.2 %
BILIRUB SERPL-MCNC: 0.2 MG/DL
BUN SERPL-MCNC: 10 MG/DL
CALCIUM SERPL-MCNC: 9.4 MG/DL
CHLORIDE SERPL-SCNC: 102 MMOL/L
CHOLEST SERPL-MCNC: 270 MG/DL
CO2 SERPL-SCNC: 24 MMOL/L
COVID-19 NUCLEOCAPSID  GAM ANTIBODY INTERPRETATION: POSITIVE
CREAT SERPL-MCNC: 0.74 MG/DL
EOSINOPHIL # BLD AUTO: 0.08 K/UL
EOSINOPHIL NFR BLD AUTO: 1.3 %
ESTIMATED AVERAGE GLUCOSE: 114 MG/DL
GLUCOSE SERPL-MCNC: 76 MG/DL
HBA1C MFR BLD HPLC: 5.6 %
HCT VFR BLD CALC: 39.4 %
HDLC SERPL-MCNC: 100 MG/DL
HGB BLD-MCNC: 11.9 G/DL
IMM GRANULOCYTES NFR BLD AUTO: 0.3 %
LDLC SERPL CALC-MCNC: 160 MG/DL
LYMPHOCYTES # BLD AUTO: 2.62 K/UL
LYMPHOCYTES NFR BLD AUTO: 43.5 %
MAN DIFF?: NORMAL
MCHC RBC-ENTMCNC: 27.9 PG
MCHC RBC-ENTMCNC: 30.2 GM/DL
MCV RBC AUTO: 92.5 FL
MONOCYTES # BLD AUTO: 0.53 K/UL
MONOCYTES NFR BLD AUTO: 8.8 %
NEUTROPHILS # BLD AUTO: 2.76 K/UL
NEUTROPHILS NFR BLD AUTO: 45.9 %
NONHDLC SERPL-MCNC: 170 MG/DL
PLATELET # BLD AUTO: 326 K/UL
POTASSIUM SERPL-SCNC: 4.4 MMOL/L
PROT SERPL-MCNC: 7.3 G/DL
RBC # BLD: 4.26 M/UL
RBC # FLD: 14.5 %
SARS-COV-2 AB SERPL QL IA: 30.2 INDEX
SODIUM SERPL-SCNC: 138 MMOL/L
TRIGL SERPL-MCNC: 50 MG/DL
TSH SERPL-ACNC: 12.3 UIU/ML
WBC # FLD AUTO: 6.02 K/UL

## 2021-06-09 ENCOUNTER — APPOINTMENT (OUTPATIENT)
Dept: ULTRASOUND IMAGING | Facility: CLINIC | Age: 41
End: 2021-06-09

## 2021-06-23 ENCOUNTER — RESULT REVIEW (OUTPATIENT)
Age: 41
End: 2021-06-23

## 2021-06-23 ENCOUNTER — APPOINTMENT (OUTPATIENT)
Dept: ULTRASOUND IMAGING | Facility: CLINIC | Age: 41
End: 2021-06-23
Payer: COMMERCIAL

## 2021-06-23 PROCEDURE — 76641 ULTRASOUND BREAST COMPLETE: CPT | Mod: 50

## 2021-07-13 ENCOUNTER — APPOINTMENT (OUTPATIENT)
Dept: INTERNAL MEDICINE | Facility: CLINIC | Age: 41
End: 2021-07-13

## 2021-07-20 ENCOUNTER — APPOINTMENT (OUTPATIENT)
Dept: INTERNAL MEDICINE | Facility: CLINIC | Age: 41
End: 2021-07-20
Payer: COMMERCIAL

## 2021-07-20 DIAGNOSIS — J30.9 ALLERGIC RHINITIS, UNSPECIFIED: ICD-10-CM

## 2021-07-20 PROCEDURE — 99214 OFFICE O/P EST MOD 30 MIN: CPT | Mod: 95

## 2021-08-09 ENCOUNTER — APPOINTMENT (OUTPATIENT)
Dept: OPHTHALMOLOGY | Facility: CLINIC | Age: 41
End: 2021-08-09
Payer: COMMERCIAL

## 2021-08-09 ENCOUNTER — NON-APPOINTMENT (OUTPATIENT)
Age: 41
End: 2021-08-09

## 2021-08-09 PROCEDURE — 92014 COMPRE OPH EXAM EST PT 1/>: CPT

## 2021-08-09 PROCEDURE — 99072 ADDL SUPL MATRL&STAF TM PHE: CPT

## 2021-08-09 PROCEDURE — 92250 FUNDUS PHOTOGRAPHY W/I&R: CPT

## 2021-09-07 ENCOUNTER — RX RENEWAL (OUTPATIENT)
Age: 41
End: 2021-09-07

## 2021-10-04 ENCOUNTER — FORM ENCOUNTER (OUTPATIENT)
Age: 41
End: 2021-10-04

## 2021-10-05 ENCOUNTER — APPOINTMENT (OUTPATIENT)
Dept: OBGYN | Facility: CLINIC | Age: 41
End: 2021-10-05
Payer: COMMERCIAL

## 2021-10-05 VITALS
BODY MASS INDEX: 22.04 KG/M2 | SYSTOLIC BLOOD PRESSURE: 100 MMHG | HEIGHT: 58 IN | DIASTOLIC BLOOD PRESSURE: 70 MMHG | WEIGHT: 105 LBS

## 2021-10-05 PROCEDURE — 99072 ADDL SUPL MATRL&STAF TM PHE: CPT

## 2021-10-05 PROCEDURE — 36415 COLL VENOUS BLD VENIPUNCTURE: CPT

## 2021-10-05 PROCEDURE — 81025 URINE PREGNANCY TEST: CPT

## 2021-10-05 PROCEDURE — 99213 OFFICE O/P EST LOW 20 MIN: CPT

## 2021-10-11 ENCOUNTER — FORM ENCOUNTER (OUTPATIENT)
Age: 41
End: 2021-10-11

## 2021-10-12 ENCOUNTER — APPOINTMENT (OUTPATIENT)
Dept: OBGYN | Facility: CLINIC | Age: 41
End: 2021-10-12
Payer: COMMERCIAL

## 2021-10-12 ENCOUNTER — FORM ENCOUNTER (OUTPATIENT)
Age: 41
End: 2021-10-12

## 2021-10-12 PROCEDURE — 99072 ADDL SUPL MATRL&STAF TM PHE: CPT

## 2021-10-12 PROCEDURE — 36415 COLL VENOUS BLD VENIPUNCTURE: CPT

## 2021-10-14 ENCOUNTER — FORM ENCOUNTER (OUTPATIENT)
Age: 41
End: 2021-10-14

## 2021-10-22 ENCOUNTER — RESULT REVIEW (OUTPATIENT)
Age: 41
End: 2021-10-22

## 2021-10-22 ENCOUNTER — APPOINTMENT (OUTPATIENT)
Dept: ULTRASOUND IMAGING | Facility: CLINIC | Age: 41
End: 2021-10-22
Payer: COMMERCIAL

## 2021-10-22 PROCEDURE — 76830 TRANSVAGINAL US NON-OB: CPT

## 2021-10-22 PROCEDURE — 76856 US EXAM PELVIC COMPLETE: CPT

## 2021-11-02 ENCOUNTER — NON-APPOINTMENT (OUTPATIENT)
Age: 41
End: 2021-11-02

## 2021-12-02 DIAGNOSIS — Z92.89 PERSONAL HISTORY OF OTHER MEDICAL TREATMENT: ICD-10-CM

## 2021-12-02 DIAGNOSIS — Z83.3 FAMILY HISTORY OF DIABETES MELLITUS: ICD-10-CM

## 2021-12-02 DIAGNOSIS — Z98.890 OTHER SPECIFIED POSTPROCEDURAL STATES: ICD-10-CM

## 2022-01-14 ENCOUNTER — APPOINTMENT (OUTPATIENT)
Dept: ULTRASOUND IMAGING | Facility: CLINIC | Age: 42
End: 2022-01-14
Payer: COMMERCIAL

## 2022-01-14 ENCOUNTER — OUTPATIENT (OUTPATIENT)
Dept: OUTPATIENT SERVICES | Facility: HOSPITAL | Age: 42
LOS: 1 days | End: 2022-01-14
Payer: COMMERCIAL

## 2022-01-14 ENCOUNTER — APPOINTMENT (OUTPATIENT)
Dept: OBGYN | Facility: CLINIC | Age: 42
End: 2022-01-14

## 2022-01-14 DIAGNOSIS — Z00.8 ENCOUNTER FOR OTHER GENERAL EXAMINATION: ICD-10-CM

## 2022-01-14 PROCEDURE — 76642 ULTRASOUND BREAST LIMITED: CPT | Mod: 26,RT

## 2022-01-14 PROCEDURE — 76536 US EXAM OF HEAD AND NECK: CPT | Mod: 26

## 2022-01-14 PROCEDURE — 76642 ULTRASOUND BREAST LIMITED: CPT

## 2022-01-14 PROCEDURE — 76536 US EXAM OF HEAD AND NECK: CPT

## 2022-01-21 ENCOUNTER — APPOINTMENT (OUTPATIENT)
Dept: ULTRASOUND IMAGING | Facility: CLINIC | Age: 42
End: 2022-01-21

## 2022-01-28 ENCOUNTER — APPOINTMENT (OUTPATIENT)
Dept: ENDOCRINOLOGY | Facility: CLINIC | Age: 42
End: 2022-01-28

## 2022-02-02 ENCOUNTER — TRANSCRIPTION ENCOUNTER (OUTPATIENT)
Age: 42
End: 2022-02-02

## 2022-02-02 ENCOUNTER — EMERGENCY (EMERGENCY)
Facility: HOSPITAL | Age: 42
LOS: 1 days | Discharge: HOME | End: 2022-02-02
Attending: EMERGENCY MEDICINE
Payer: COMMERCIAL

## 2022-02-02 VITALS
HEIGHT: 57 IN | SYSTOLIC BLOOD PRESSURE: 149 MMHG | RESPIRATION RATE: 18 BRPM | TEMPERATURE: 98 F | HEART RATE: 63 BPM | DIASTOLIC BLOOD PRESSURE: 92 MMHG | WEIGHT: 104.94 LBS | OXYGEN SATURATION: 99 %

## 2022-02-02 DIAGNOSIS — R22.1 LOCALIZED SWELLING, MASS AND LUMP, NECK: ICD-10-CM

## 2022-02-02 LAB
ALBUMIN SERPL ELPH-MCNC: 4.9 G/DL — SIGNIFICANT CHANGE UP (ref 3.3–5)
ALP SERPL-CCNC: 91 U/L — SIGNIFICANT CHANGE UP (ref 40–120)
ALT FLD-CCNC: 109 U/L — HIGH (ref 10–45)
ANION GAP SERPL CALC-SCNC: 13 MMOL/L — SIGNIFICANT CHANGE UP (ref 5–17)
AST SERPL-CCNC: 103 U/L — HIGH (ref 10–40)
BASOPHILS # BLD AUTO: 0.02 K/UL — SIGNIFICANT CHANGE UP (ref 0–0.2)
BASOPHILS NFR BLD AUTO: 0.5 % — SIGNIFICANT CHANGE UP (ref 0–2)
BILIRUB SERPL-MCNC: 0.3 MG/DL — SIGNIFICANT CHANGE UP (ref 0.2–1.2)
BUN SERPL-MCNC: 7 MG/DL — SIGNIFICANT CHANGE UP (ref 7–23)
CALCIUM SERPL-MCNC: 10.1 MG/DL — SIGNIFICANT CHANGE UP (ref 8.4–10.5)
CHLORIDE SERPL-SCNC: 102 MMOL/L — SIGNIFICANT CHANGE UP (ref 96–108)
CO2 SERPL-SCNC: 26 MMOL/L — SIGNIFICANT CHANGE UP (ref 22–31)
CREAT SERPL-MCNC: 1.1 MG/DL — SIGNIFICANT CHANGE UP (ref 0.5–1.3)
EOSINOPHIL # BLD AUTO: 0.06 K/UL — SIGNIFICANT CHANGE UP (ref 0–0.5)
EOSINOPHIL NFR BLD AUTO: 1.5 % — SIGNIFICANT CHANGE UP (ref 0–6)
GLUCOSE SERPL-MCNC: 143 MG/DL — HIGH (ref 70–99)
HCT VFR BLD CALC: 43.5 % — SIGNIFICANT CHANGE UP (ref 34.5–45)
HGB BLD-MCNC: 14.1 G/DL — SIGNIFICANT CHANGE UP (ref 11.5–15.5)
IMM GRANULOCYTES NFR BLD AUTO: 0.7 % — SIGNIFICANT CHANGE UP (ref 0–1.5)
LYMPHOCYTES # BLD AUTO: 1.71 K/UL — SIGNIFICANT CHANGE UP (ref 1–3.3)
LYMPHOCYTES # BLD AUTO: 42.4 % — SIGNIFICANT CHANGE UP (ref 13–44)
MCHC RBC-ENTMCNC: 31 PG — SIGNIFICANT CHANGE UP (ref 27–34)
MCHC RBC-ENTMCNC: 32.4 GM/DL — SIGNIFICANT CHANGE UP (ref 32–36)
MCV RBC AUTO: 95.6 FL — SIGNIFICANT CHANGE UP (ref 80–100)
MONOCYTES # BLD AUTO: 0.3 K/UL — SIGNIFICANT CHANGE UP (ref 0–0.9)
MONOCYTES NFR BLD AUTO: 7.4 % — SIGNIFICANT CHANGE UP (ref 2–14)
NEUTROPHILS # BLD AUTO: 1.91 K/UL — SIGNIFICANT CHANGE UP (ref 1.8–7.4)
NEUTROPHILS NFR BLD AUTO: 47.5 % — SIGNIFICANT CHANGE UP (ref 43–77)
NRBC # BLD: 0 /100 WBCS — SIGNIFICANT CHANGE UP (ref 0–0)
PLATELET # BLD AUTO: 208 K/UL — SIGNIFICANT CHANGE UP (ref 150–400)
POTASSIUM SERPL-MCNC: 4 MMOL/L — SIGNIFICANT CHANGE UP (ref 3.5–5.3)
POTASSIUM SERPL-SCNC: 4 MMOL/L — SIGNIFICANT CHANGE UP (ref 3.5–5.3)
PROT SERPL-MCNC: 7.5 G/DL — SIGNIFICANT CHANGE UP (ref 6–8.3)
RBC # BLD: 4.55 M/UL — SIGNIFICANT CHANGE UP (ref 3.8–5.2)
RBC # FLD: 13.7 % — SIGNIFICANT CHANGE UP (ref 10.3–14.5)
SODIUM SERPL-SCNC: 141 MMOL/L — SIGNIFICANT CHANGE UP (ref 135–145)
WBC # BLD: 4.03 K/UL — SIGNIFICANT CHANGE UP (ref 3.8–10.5)
WBC # FLD AUTO: 4.03 K/UL — SIGNIFICANT CHANGE UP (ref 3.8–10.5)

## 2022-02-02 PROCEDURE — 99220: CPT

## 2022-02-02 PROCEDURE — 31525 DX LARYNGOSCOPY EXCL NB: CPT

## 2022-02-02 PROCEDURE — 70491 CT SOFT TISSUE NECK W/DYE: CPT | Mod: 26,MA

## 2022-02-02 NOTE — ED CLERICAL - NS ED CLERK NOTE PRE-ARRIVAL INFORMATION; ADDITIONAL PRE-ARRIVAL INFORMATION
CC/Reason For referral: SUBMANDIBULAR SWELLING  Preferred Consultant(if applicable):  Who admits for you (if needed):  Do you have documents you would like to fax over?  Would you still like to speak to an ED attending? N

## 2022-02-02 NOTE — ED PROVIDER NOTE - NSFOLLOWUPINSTRUCTIONS_ED_ALL_ED_FT
You were seen and evaluated in the ED for neck and facial swelling as well as change in voice and tongue heaviness.   Please make sure to follow up with your primary care doctor within 1-2 days and with the Endocrinology specialist. The information for follow up can be found below. Bring a copy of all of your results with you to your follow up appointments.   Return to the ER as discussed if you develop any new or worsening symptoms.

## 2022-02-02 NOTE — ED CDU PROVIDER INITIAL DAY NOTE - DETAILS
Facial swelling  -ENT following  -Endo consult  -VANDANA Andujar, vitals every 4 hours, frequent reevaluations Facial swelling  -ENT following  -Endo consult, ED paged, OK for AM consult if not response back  -VANDANA Andujar, vitals every 4 hours, frequent reevaluations

## 2022-02-02 NOTE — ED ADULT TRIAGE NOTE - PAIN RATING/NUMBER SCALE (0-10): ACTIVITY
Medication Requested: Lisinopril 40 mg tab and Metformin 1000 mg tab    Last Refilled: 5/14/19    Last Office Visit: 10/12/18    Next Scheduled Office Visit: not scheduled            
0

## 2022-02-02 NOTE — ED PROVIDER NOTE - OBJECTIVE STATEMENT
40 yo F with a PMH of hypothyroidism p/w neck and facial swelling x 5 days. No neck pain. Associated feeling like her voice sounds "funny and deeper" than normal. Has also been feeling like her tongue is heavy. Able to tolerate PO. No feeling of throat closing or throat swelling. No rash. States she stopped taking her synthroid approx 1 mo ago, replaced medication with iodine drops that she has been using daily. Called her dr who advised she discontinue the drops and restart her synthroid. Picked up rx today. [place] to [facility] and was told to come to ED for eval. Denies nausea, vomiting, fever, chills, congestion, sore throat, ear pain, neck pain, chest pain, SOB, palpitations, difficulty breathing, headache, dizziness, weakness, recent illness, sick contacts, insomnia, recent weight loss. Had COVID in 12/2021. No new products or shampoos, detergents, etc. Follows with Dr. Celina Sun, per chart review last seen 06/2020, at that time pt was on synthroid 100mcg/daily.

## 2022-02-02 NOTE — ED CDU PROVIDER INITIAL DAY NOTE - NS ED ROS FT
Constitutional: No fever or chills  Eyes: No visual changes, eye pain   Face: +Facial/throat swelling, no throat pain +change in voice   CV: No chest pain or lower extremity edema  Resp: No SOB no cough  GI: No abd pain. No nausea or vomiting. No diarrhea.   : No dysuria, hematuria.   MSK: No musculoskeletal pain  Skin: No rash  Psych: No complaints   Neuro: No headache. + numbness to tongue No weakness.  Endo: +Hypothyroidism

## 2022-02-02 NOTE — ED PROVIDER NOTE - RAPID ASSESSMENT
41 y F with PMHx of Hypothyroidism presents to the ED c/o neck and facial swelling, throat pain, voice change. Pt had stopped taking synthroid and started taking iodine 3-4 days ago. Pt started on synthroid again today. Pt is well appearing.     Scribe Statement: Mike COVINGTON Tiffany, attest that this documentation has been prepared under the direction and in the presence of Reginaldo Clancy) 41 y F with PMHx of Hypothyroidism presents to the ED c/o neck and facial swelling, throat pain, voice change. Pt had stopped taking synthroid and started taking iodine 3-4 days ago. Pt started on synthroid again today. Pt is well appearing.     Scribe Statement: I, Lola Mckeon, attest that this documentation has been prepared under the direction and in the presence of Reginaldo Clancy)  I, Dr. Clancy, personally performed the service described in the documentation recorded by the scribe in my presence, and it accurately and completely records my words and actions.

## 2022-02-02 NOTE — CONSULT NOTE ADULT - PROBLEM SELECTOR RECOMMENDATION 9
No acute ENT interventions at this time   Patient should follow up with endocrinologist as an outpatient   Please call [facility] #[phone] if CT noted with significant ENT findings   Reconsult ENT PRN

## 2022-02-02 NOTE — ED CDU PROVIDER INITIAL DAY NOTE - PHYSICAL EXAMINATION
CONSTITUTIONAL: Well appearing and in no apparent distress.  	ENT: Airway patent, moist mucous membranes.   	+b/l Mild submandibular swelling seems c/w lymphadenopathy, nontender. FROM neck, no swelling, nontender. No thyroid nodules palpated.   	Uvula midline. Oropharynx appear clear although limited exam. No drooling. Speaking in clear full sentences.   	EYES: Pupils equal, round and reactive to light. EOMI. Conjunctiva normal appearing..   	CARDIAC: Normal rate, regular rhythm.  Heart sounds S1, S2.    	RESPIRATORY: Breath sounds clear and equal bilaterally.  Speaking in full sentences, tolerating secretions.   	GASTROINTESTINAL: Abdomen soft, non-tender, not distended.  	MUSCULOSKELETAL: Moving all extremities freely   	NEUROLOGICAL: Alert and oriented x3, no gross deficits, follows commands  	SKIN: Skin normal color, warm, dry and intact. No visible rash.  PSYCHIATRIC: Normal mood and affect.

## 2022-02-02 NOTE — ED PROVIDER NOTE - CLINICAL SUMMARY MEDICAL DECISION MAKING FREE TEXT BOX
neck/facial swelling, change in voice/tongue heaviness/periorbital edema, borderline HTN  recently on iodine drops, ?possible over medication causing hyperthyroidism   submandibular swelling with cervical lymphadenopathy r/o deeper airway involvement, will cx ENT for possible scope    Labs  CT neck  Reassess neck/facial swelling, change in voice/tongue heaviness/periorbital edema, borderline HTN  recently on iodine drops, ?possible over medication causing hyperthyroidism   submandibular swelling with cervical lymphadenopathy r/o deeper airway involvement, will cx ENT for possible scope    Labs  CT neck  Reassess    Attending Statement: Agree with the above.  B/L submandibular lymphadenophy, less likely thyromegaly but possible given patient c known hypothyroid abstaining from synthroid x 1 mon and only taking iodine drops; could now be in hyperthyroid state given findings +HTN.  No issac thyrotoxicosis.  No CP/SOB/CHF/NVD/hemodynamic instability.  Will need TFTs ordered from qdoc.  Would also check CT neck/soft tissue and c/s ENT for scope to assess for massess or airway obstruction, though clinically none present.  Likely CDU for labs and endo c/s in AM.  -SHARMILAM

## 2022-02-02 NOTE — CONSULT NOTE ADULT - ASSESSMENT
Will come see patient for laryngoscopy soon  40 yo F with a PMH of hypothyroidism p/w neck and facial swelling x 5 days, voice changes and tongue numbness/swelling. States she stopped taking her synthroid approx 1 mo ago, replaced medication with iodine drops that she has been using daily. ENT consulted for upper airway evaluation. On physical exam, noted with some submandibular lymphadenopathy. No obvious/significant tongue or face swelling. Laryngoscopy done at bedside, airway patent. No swelling or erythema noted.

## 2022-02-02 NOTE — ED ADULT NURSE REASSESSMENT NOTE - NS ED NURSE REASSESS COMMENT FT1
Patient was educated on plan of care. Going to CDU for airway monitoring and endocrine consult in the AM.

## 2022-02-02 NOTE — CONSULT NOTE ADULT - SUBJECTIVE AND OBJECTIVE BOX
CC: voice changes and neck swelling     HPI: 42 yo F with a PMH of hypothyroidism p/w neck and facial swelling x 5 days. No neck pain. Associated feeling like her voice sounds "funny and deeper" than normal. Has also been feeling like her tongue is heavy. Able to tolerate PO. No feeling of throat closing or throat swelling. No rash. States she stopped taking her synthroid approx 1 mo ago, replaced medication with iodine drops that she has been using daily. Called her dr who advised she discontinue the drops and restart her synthroid. Picked up rx today. Went to urgent care prior to admission and was told to come to ED for eval. Denies nausea, vomiting, fever, chills, congestion, sore throat, ear pain, neck pain, chest pain, SOB, palpitations, difficulty breathing, headache, dizziness, weakness, recent illness, sick contacts, insomnia, recent weight loss. Had COVID in 12/2021. No new products or shampoos, detergents, etc. Follows with Dr. Celina Sun, per chart review last seen 06/2020, at that time pt was on synthroid 100mcg/daily.    PAST MEDICAL & SURGICAL HISTORY:  Hypothyroidism      Allergies    No Known Allergies    Intolerances      MEDICATIONS  (STANDING):    MEDICATIONS  (PRN):      Social History: **??**    Family history: **??**    ROS:   ENT: all negative except as noted in HPI   CV: denies palpitations  Pulm: denies SOB, cough, hemoptysis  GI: denies change in apetite, indigestion, n/v  : denies pertinent urinary symptoms, urgency  Neuro: denies numbness/tingling, loss of sensation  Psych: denies anxiety  MS: denies muscle weakness, instability  Heme: denies easy bruising or bleeding  Endo: denies heat/cold intolerance, excessive sweating  Vascular: denies LE edema    Vital Signs Last 24 Hrs  T(C): 36.6 (02 Feb 2022 21:25), Max: 36.6 (02 Feb 2022 21:25)  T(F): 97.9 (02 Feb 2022 21:25), Max: 97.9 (02 Feb 2022 21:25)  HR: 62 (02 Feb 2022 21:25) (62 - 63)  BP: 136/91 (02 Feb 2022 21:25) (136/91 - 149/92)  BP(mean): --  RR: 16 (02 Feb 2022 21:25) (16 - 18)  SpO2: 99% (02 Feb 2022 21:25) (99% - 99%)                          14.1   4.03  )-----------( 208      ( 02 Feb 2022 21:31 )             43.5             PHYSICAL EXAM:  Gen: NAD  Skin: No rashes, bruises, or lesions  Head: Normocephalic, Atraumatic  Face: no edema, erythema, or fluctuance. Parotid glands soft without mass  Eyes: no scleral injection  Nose: Nares bilaterally patent, no discharge  Mouth: No Stridor / Drooling / Trismus.  Mucosa moist, tongue/uvula midline, oropharynx clear  Neck: Flat, supple, no lymphadenopathy, trachea midline, no masses  Lymphatic: No lymphadenopathy  Resp: breathing easily, no stridor  CV: no peripheral edema/cyanosis  GI: nondistended   Peripheral vascular: no JVD or edema  Neuro: facial nerve intact, no facial droop        Diagnostic Nasal Endoscopy: (Scope #2 used)    Fiberoptic Indirect laryngoscopy:  (Scope #2 used)        IMAGING/ADDITIONAL STUDIES:  CC: voice changes and neck swelling     HPI: 40 yo F with a PMH of hypothyroidism p/w neck and facial swelling x 5 days. No neck pain. Associated feeling like her voice sounds "funny and deeper" than normal. Has also been feeling like her tongue is heavy. Able to tolerate PO. No feeling of throat closing or throat swelling. No rash. States she stopped taking her synthroid approx 1 mo ago, replaced medication with iodine drops that she has been using daily. Called her dr who advised she discontinue the drops and restart her synthroid. Picked up rx today. Went to urgent care prior to admission and was told to come to ED for eval. Denies nausea, vomiting, fever, chills, congestion, sore throat, ear pain, neck pain, chest pain, SOB, palpitations, difficulty breathing, headache, dizziness, weakness, recent illness, sick contacts, insomnia, recent weight loss. Had COVID in 12/2021. No new products or shampoos, detergents, etc. Follows with Uri Santiago, Dr. Patrick, per chart review last seen 06/2020, at that time pt was on synthroid 100mcg/daily.    PAST MEDICAL & SURGICAL HISTORY:  Hypothyroidism      Allergies    No Known Allergies    Intolerances      MEDICATIONS  (STANDING):    MEDICATIONS  (PRN):      Social History: nonsmoker, bach     Family history: no pertinent family history     ROS:   ENT: all negative except as noted in HPI   CV: denies palpitations  Pulm: denies SOB, cough, hemoptysis  GI: denies change in apetite, indigestion, n/v  : denies pertinent urinary symptoms, urgency  Neuro: denies numbness/tingling, loss of sensation  Psych: denies anxiety  MS: denies muscle weakness, instability  Heme: denies easy bruising or bleeding  Endo: see HPI   Vascular: denies LE edema    Vital Signs Last 24 Hrs  T(C): 36.6 (02 Feb 2022 21:25), Max: 36.6 (02 Feb 2022 21:25)  T(F): 97.9 (02 Feb 2022 21:25), Max: 97.9 (02 Feb 2022 21:25)  HR: 62 (02 Feb 2022 21:25) (62 - 63)  BP: 136/91 (02 Feb 2022 21:25) (136/91 - 149/92)  BP(mean): --  RR: 16 (02 Feb 2022 21:25) (16 - 18)  SpO2: 99% (02 Feb 2022 21:25) (99% - 99%)                          14.1   4.03  )-----------( 208      ( 02 Feb 2022 21:31 )             43.5             PHYSICAL EXAM:  Gen: NAD  Skin: No rashes, bruises, or lesions  Head: Normocephalic, Atraumatic  Face: no edema, erythema, or fluctuance. Parotid glands soft without mass  Eyes: no scleral injection  Nose: Nares bilaterally patent, no discharge  Mouth: No Stridor / Drooling / Trismus.  Mucosa moist, tongue/uvula midline, oropharynx clear  Neck: Flat, supple,  submandibular lymphadenopathy, trachea midline, no masses  Lymphatic:  + lymphadenopathy  Resp: breathing easily, no stridor  CV: no peripheral edema/cyanosis  GI: nondistended   Peripheral vascular: no JVD or edema  Neuro: facial nerve intact, no facial droop      Fiberoptic Indirect laryngoscopy:  (Scope #2 used)  Reason for Laryngoscopy: upper airway evaluation     Patient was unable to cooperate with mirror.  Nasopharynx, oropharynx, and hypopharynx clear, no bleeding. Tongue base, posterior pharyngeal wall, vallecula, epiglottis, and subglottis appear normal. No erythema, edema, pooling of secretions, masses or lesions. Airway patent, no foreign body visualized. No glottic/supraglottic edema. True vocal cords, arytenoids, vestibular folds, ventricles, pyriform sinuses, and aryepiglottic folds appear normal bilaterally. Vocal cords mobile with good contact b/l.              IMAGING/ADDITIONAL STUDIES: pending CT

## 2022-02-02 NOTE — ED PROVIDER NOTE - PHYSICAL EXAMINATION
CONSTITUTIONAL: Well appearing and in no apparent distress.  ENT: Airway patent, moist mucous membranes.   +Mild submandibular swelling, with enlarged cervical lymph nodes. No goiter or thyroid nodules palpated.   Uvula midline. No swelling. Oropharynx clear. TMs clear bilaterally.   EYES: Pupils equal, round and reactive to light. EOMI. Conjunctiva normal appearing. Mild periorbital swelling. Some proptosis.   CARDIAC: Normal rate, regular rhythm.  Heart sounds S1, S2.    RESPIRATORY: Breath sounds clear and equal bilaterally.  Speaking in full sentences, tolerating secretions.   GASTROINTESTINAL: Abdomen soft, non-tender, not distended.  MUSCULOSKELETAL: Spine appears normal.  NEUROLOGICAL: Alert and oriented x3, no focal deficits, no motor or sensory deficits. 5/5 muscle strength throughout.  SKIN: Skin normal color, warm, dry and intact. No rash.  PSYCHIATRIC: Normal mood and affect. CONSTITUTIONAL: Well appearing and in no apparent distress.  ENT: Airway patent, moist mucous membranes.  ?mild R>L exophthalmos?   +b/l Mild submandibular swelling seems c/w lymphadenopathy, with enlarged cervical lymph nodes. No goiter or thyroid nodules palpated.   Uvula midline. No swelling. Oropharynx clear. TMs clear bilaterally. Very mild dysphonia but no stridor; patient is protecting airway easily and tolerating secretions.  No obvious thyromegaly.  Submandibular space is soft.  No obvious macroglossia and floor of mouth/tongue is not elevated.  Supple neck.  EYES: Pupils equal, round and reactive to light. EOMI. Conjunctiva normal appearing. Mild periorbital swelling. Some proptosis.   CARDIAC: Normal rate, regular rhythm.  Heart sounds S1, S2.    RESPIRATORY: Breath sounds clear and equal bilaterally.  Speaking in full sentences, tolerating secretions.   GASTROINTESTINAL: Abdomen soft, non-tender, not distended.  MUSCULOSKELETAL: Spine appears normal.  NEUROLOGICAL: Alert and oriented x3, no focal deficits, no motor or sensory deficits. 5/5 muscle strength throughout.  SKIN: Skin normal color, warm, dry and intact. No rash.  PSYCHIATRIC: Normal mood and affect.

## 2022-02-02 NOTE — ED CDU PROVIDER INITIAL DAY NOTE - OBJECTIVE STATEMENT
40 yo F with a PMH of hypothyroidism p/w and facial swelling x 5 days. Endorses swelling to area under her chin. Associated feeling like her voice sounds "funny and deeper" than normal. Has also been feeling like her tongue is numb. Able to tolerate PO. No feeling of throat closing or throat swelling. No rash. States she stopped taking her synthroid approx 1 mo ago, replaced medication with iodine drops that she has been using daily. Called her dr who advised she discontinue the drops and restart her synthroid. Picked up rx today for Synthroid 112mcg, which she was told to start tomorrow. Went to urgent care and was told to come to ED for eval. Denies nausea, vomiting, fever, chills, congestion, sore throat, ear pain, neck pain, chest pain, SOB, palpitations, difficulty breathing, headache, dizziness, weakness. Had COVID in 12/2021. No new products or shampoos. Endo: Dr. Patrick.  ED course: Laryngoscopy performed by ENT, no acute findings. Pending CT read. Thyroid labs sent. Plan for Endo consult in CDU.

## 2022-02-02 NOTE — ED PROVIDER NOTE - PROGRESS NOTE DETAILS
Noted US thyroid 1/14/22.  Questionable thyroiditis.  Will likely c/s endo.  --[facility] End[facility] paged  Colleen Muñoz PA-C

## 2022-02-02 NOTE — ED ADULT NURSE NOTE - OBJECTIVE STATEMENT
41y Female presents to the ED with facial swelling. 41y Female presents to the ED with neck and facial swelling. PMHx of hypothyroidism. Patient takes synthroid and stopped taking it and started taking iodine for 3-4 days. Patient is now back on synthroid. She states she has minor throat pain and her voice is deeper than her normal. Patient denies numbness, tingling, n/v/d, SOB, and CP. Patient is A&Ox3.

## 2022-02-03 VITALS
OXYGEN SATURATION: 99 % | DIASTOLIC BLOOD PRESSURE: 64 MMHG | SYSTOLIC BLOOD PRESSURE: 101 MMHG | RESPIRATION RATE: 18 BRPM | TEMPERATURE: 98 F | HEART RATE: 68 BPM

## 2022-02-03 LAB
ALBUMIN SERPL ELPH-MCNC: 4.8 G/DL — SIGNIFICANT CHANGE UP (ref 3.3–5)
ALP SERPL-CCNC: 88 U/L — SIGNIFICANT CHANGE UP (ref 40–120)
ALT FLD-CCNC: 92 U/L — HIGH (ref 10–45)
ANION GAP SERPL CALC-SCNC: 14 MMOL/L — SIGNIFICANT CHANGE UP (ref 5–17)
AST SERPL-CCNC: 71 U/L — HIGH (ref 10–40)
BILIRUB SERPL-MCNC: 0.3 MG/DL — SIGNIFICANT CHANGE UP (ref 0.2–1.2)
BUN SERPL-MCNC: 7 MG/DL — SIGNIFICANT CHANGE UP (ref 7–23)
CALCIUM SERPL-MCNC: 9.7 MG/DL — SIGNIFICANT CHANGE UP (ref 8.4–10.5)
CHLORIDE SERPL-SCNC: 99 MMOL/L — SIGNIFICANT CHANGE UP (ref 96–108)
CO2 SERPL-SCNC: 23 MMOL/L — SIGNIFICANT CHANGE UP (ref 22–31)
CREAT SERPL-MCNC: 1.12 MG/DL — SIGNIFICANT CHANGE UP (ref 0.5–1.3)
GLUCOSE SERPL-MCNC: 87 MG/DL — SIGNIFICANT CHANGE UP (ref 70–99)
POTASSIUM SERPL-MCNC: 3.6 MMOL/L — SIGNIFICANT CHANGE UP (ref 3.5–5.3)
POTASSIUM SERPL-SCNC: 3.6 MMOL/L — SIGNIFICANT CHANGE UP (ref 3.5–5.3)
PROT SERPL-MCNC: 7 G/DL — SIGNIFICANT CHANGE UP (ref 6–8.3)
SARS-COV-2 RNA SPEC QL NAA+PROBE: SIGNIFICANT CHANGE UP
SODIUM SERPL-SCNC: 136 MMOL/L — SIGNIFICANT CHANGE UP (ref 135–145)
T3 SERPL-MCNC: <20 NG/DL — LOW (ref 80–200)
T4 AB SER-ACNC: <0.4 UG/DL — LOW (ref 4.6–12)
TSH SERPL-MCNC: 292 UIU/ML — HIGH (ref 0.27–4.2)

## 2022-02-03 PROCEDURE — 99285 EMERGENCY DEPT VISIT HI MDM: CPT | Mod: 25

## 2022-02-03 PROCEDURE — 80053 COMPREHEN METABOLIC PANEL: CPT

## 2022-02-03 PROCEDURE — 84480 ASSAY TRIIODOTHYRONINE (T3): CPT

## 2022-02-03 PROCEDURE — G0378: CPT

## 2022-02-03 PROCEDURE — 87635 SARS-COV-2 COVID-19 AMP PRB: CPT

## 2022-02-03 PROCEDURE — 84702 CHORIONIC GONADOTROPIN TEST: CPT

## 2022-02-03 PROCEDURE — 84436 ASSAY OF TOTAL THYROXINE: CPT

## 2022-02-03 PROCEDURE — 84443 ASSAY THYROID STIM HORMONE: CPT

## 2022-02-03 PROCEDURE — 31505 DIAGNOSTIC LARYNGOSCOPY: CPT

## 2022-02-03 PROCEDURE — 70491 CT SOFT TISSUE NECK W/DYE: CPT | Mod: MA

## 2022-02-03 PROCEDURE — 85025 COMPLETE CBC W/AUTO DIFF WBC: CPT

## 2022-02-03 PROCEDURE — 99217: CPT

## 2022-02-03 RX ORDER — LEVOTHYROXINE SODIUM 125 MCG
1 TABLET ORAL
Qty: 30 | Refills: 0
Start: 2022-02-03

## 2022-02-03 RX ORDER — LEVOTHYROXINE SODIUM 125 MCG
100 TABLET ORAL DAILY
Refills: 0 | Status: DISCONTINUED | OUTPATIENT
Start: 2022-02-03 | End: 2022-02-06

## 2022-02-03 RX ADMIN — Medication 100 MICROGRAM(S): at 08:37

## 2022-02-03 NOTE — ED CDU PROVIDER SUBSEQUENT DAY NOTE - MEDICAL DECISION MAKING DETAILS
zaid - pt with facial swelling non compliant w thyroid meds found to be severely hypothyroid -- labs, synthroid and endo -

## 2022-02-03 NOTE — ED CDU PROVIDER SUBSEQUENT DAY NOTE - PROGRESS NOTE DETAILS
zaid - pt seen and eval - feeling better no op swelling as per ent - hypothyroid will give synthroid awaiting endo Patient seen at bedside in NAD.  VSS.  Patient resting comfortably.  Feeling well.  Denies difficulty breathing/swallowing.   and t3/t4 undetectable.  Patient restarted on synthroid 100mcg.  Discussed case with endocrine, Dr. Peña who agrees symptoms likely 2/2 hypothyroidism and is  recommending to continue synthroid 100mcg for DC.  Patient instructed to follow up with endocrinology as soon as possible.  Strict return precautions provided.  -Tyler King PA-C

## 2022-02-03 NOTE — ED CDU PROVIDER DISPOSITION NOTE - NSFOLLOWUPINSTRUCTIONS_ED_ALL_ED_FT
Hydrate.     Please take Tylenol 650mg and Motrin 600mg every 6 hours as needed for pain.     We recommend you follow up with your primary care provider within the next 2-3 days, please bring all of your results with you. You can also follow up with your Endocrinologist. Call the office today to make a follow up appointment.    STOP using Iodine in place of Synthroid.    Please return to the Emergency Department with new, worsening, or concerning symptoms, such as:  -Shortness of breath or trouble breathing  -Tongue/Lip swelling  -Pressure, pain, tightness in chest  -Facial drooping, arm weakness, or speech difficulty   -Head injury or loss of consciousness   -Nonstop bleeding or an open wound     *More detailed information regarding your visit and discharge can be found by reviewing this packet 1.  Continue current home medications  2.  RESTART synthroid 100mcg daily  3.  Follow up with your PMD in 2-3 days.  Bring a copy of your results with you to your appointment  4.  Follow up with endocrinology as soon as possible.  5.  Return to the ER for difficulty breathing, difficulty swallowing, lightheadedness, weakness or any other concerning symptoms    Kayli Patrick)  EndocrinologyMetabDiabetes; Internal Medicine  [address] [place] [address] [id number]  [place] [address]  Phone: [phone]  Fax: [phone]

## 2022-02-03 NOTE — ED ADULT NURSE REASSESSMENT NOTE - NS ED NURSE REASSESS COMMENT FT1
Pt received from JUJU Estrella. Pt oriented to CDU & plan of care was discussed. Pt A&O x 4. Pt in CDU for Endo consult. Pt denies any chest pain, SOB, dizziness or palpitations. V/S stable, pt afebrile,  IV in place, patent and free of signs of infiltration. Pt resting in bed. Safety & comfort measures maintained. Call bell in reach. Will continue to monitor. Pt received from JUJU Estrella. Pt oriented to CDU & plan of care was discussed. Pt A&O x 4. Pt in CDU for Endo consult. Pt denies any respiratory distress, SOB, chest pain, palpitations. Pt endorses tongue numbness. Pt has submandibular swelling. Pt on continuous Pulse ox sating 97-98%. V/S stable, pt afebrile,  IV in place, patent and free of signs of infiltration. Pt resting in bed. Safety & comfort measures maintained. Call bell in reach. Will continue to monitor.

## 2022-02-03 NOTE — ED ADULT NURSE REASSESSMENT NOTE - NS ED NURSE REASSESS COMMENT FT1
Patient received this am in NAD, VSS. Patient A&OX3, denies any discomfort at this time. Patient transferred to CDU for facial swelling and tongue numbness, patient denies any difficulty swallowing, plan of care explained. Patient lying comfortably on stretcher, call bell within reach. Will continue to monitor. Safety maintained.

## 2022-02-03 NOTE — ED CDU PROVIDER DISPOSITION NOTE - CLINICAL COURSE
42 yo F with a PMH of hypothyroidism p/w and facial swelling x 5 days. Endorses swelling to area under her chin. Associated feeling like her voice sounds "funny and deeper" than normal. Has also been feeling like her tongue is numb. Able to tolerate PO. No feeling of throat closing or throat swelling. No rash. States she stopped taking her synthroid approx 1 mo ago, replaced medication with iodine drops that she has been using daily. Called her dr who advised she discontinue the drops and restart her synthroid. Picked up rx today for Synthroid 112mcg, which she was told to start tomorrow. Went to urgent care and was told to come to ED for eval. Denies nausea, vomiting, fever, chills, congestion, sore throat, ear pain, neck pain, chest pain, SOB, palpitations, difficulty breathing, headache, dizziness, weakness. Had COVID in 12/2021. No new products or shampoos. Endo: Dr. Patrick.  ED course: Laryngoscopy performed by ENT, no acute findings. Pending CT read. Thyroid labs sent. Plan for Endo consult in CDU. 42 yo F with a PMH of hypothyroidism p/w and facial swelling x 5 days. Endorses swelling to area under her chin. Associated feeling like her voice sounds "funny and deeper" than normal. Has also been feeling like her tongue is numb. Able to tolerate PO. No feeling of throat closing or throat swelling. No rash. States she stopped taking her synthroid approx 1 mo ago, replaced medication with iodine drops that she has been using daily. Called her dr who advised she discontinue the drops and restart her synthroid. Picked up rx today for Synthroid 112mcg, which she was told to start tomorrow. Went to urgent care and was told to come to ED for eval. Denies nausea, vomiting, fever, chills, congestion, sore throat, ear pain, neck pain, chest pain, SOB, palpitations, difficulty breathing, headache, dizziness, weakness. Had COVID in 12/2021. No new products or shampoos. Endo: Dr. Patrick.  ED course: Laryngoscopy performed by ENT, no acute findings. Pending CT read. Thyroid labs sent. Plan for Endo consult in CDU.  In the CDU patient feeling well.  Denies difficulty breathing/swallowing.   and t3/t4 undetectable.  Patient restarted on synthroid 100mcg.  Discussed case with endocrine, Dr. Peña who agrees symptoms likely 2/2 hypothyroidism and is recommending to continue synthroid 100mcg for DC.  Patient instructed to follow up with endocrinology as soon as possible.  Strict return precautions provided.

## 2022-02-03 NOTE — ED CDU PROVIDER DISPOSITION NOTE - CARE PROVIDER_API CALL
Kyali Patrick)  EndocrinologyMetabDiabetes; Internal Medicine  [address] [facility] [id number]  [place] [address]  Phone: [phone]  Fax: [phone]  Follow Up Time:

## 2022-02-03 NOTE — ED CDU PROVIDER DISPOSITION NOTE - PATIENT PORTAL LINK FT
You can access the FollowMyHealth Patient Portal offered by [facility] by registering at the following website: [email or web address]. By joining [employer]’s FollowMyHealth portal, you will also be able to view your health information using other applications (apps) compatible with our system.

## 2022-02-03 NOTE — ED CDU PROVIDER DISPOSITION NOTE - ATTENDING CONTRIBUTION TO CARE
I , Dr Devonte Beckwith has seen and evaluated the patient.  The patient reports improvement in symptoms.  The patient is stable for discharge home and will follow up with their primary physician and endocrine. I , Dr Devonte Beckwith has seen and evaluated the patient.  The patient reports improvement in symptoms.  The patient is stable for discharge home and will follow up with their primary physician and endocrine this week take your synthroid as directed .

## 2022-02-03 NOTE — ED CDU PROVIDER SUBSEQUENT DAY NOTE - HISTORY
No interval changes since initial CDU provider note. Pt without complaint. NAD VSS. CT showed tonsilitis. I spoke with ENT who states that on exam oropharynx clear, does not suspect tonsilitis. Plan for endo consult, with concern for hyperthyroid.  - KAT Mcguire

## 2022-02-07 LAB — IODINE, SERUM: <20 UG/L — LOW (ref 40–92)

## 2022-02-08 NOTE — ED POST DISCHARGE NOTE - DETAILS
2/8/22: Spoke w/ pt regarding low iodie level. Known thyroid disease. Discussed importance of iodine supplementation in diet (foods rich in iodine) and endo f/u with Dr. Patrick regarding further recs. Advised to continue synthroid as prescribed. Pt acknowledged understanding, all questions answered, appreciative of call. - Alexander Graf PA-C.

## 2022-02-11 PROBLEM — E03.9 HYPOTHYROIDISM, UNSPECIFIED: Chronic | Status: ACTIVE | Noted: 2022-02-02

## 2022-02-18 ENCOUNTER — APPOINTMENT (OUTPATIENT)
Dept: INTERNAL MEDICINE | Facility: CLINIC | Age: 42
End: 2022-02-18
Payer: COMMERCIAL

## 2022-02-18 VITALS
WEIGHT: 106 LBS | RESPIRATION RATE: 17 BRPM | DIASTOLIC BLOOD PRESSURE: 80 MMHG | BODY MASS INDEX: 22.25 KG/M2 | OXYGEN SATURATION: 100 % | HEIGHT: 58 IN | TEMPERATURE: 98 F | HEART RATE: 66 BPM | SYSTOLIC BLOOD PRESSURE: 112 MMHG

## 2022-02-18 DIAGNOSIS — Z87.898 PERSONAL HISTORY OF OTHER SPECIFIED CONDITIONS: ICD-10-CM

## 2022-02-18 DIAGNOSIS — N91.2 AMENORRHEA, UNSPECIFIED: ICD-10-CM

## 2022-02-18 PROCEDURE — 99072 ADDL SUPL MATRL&STAF TM PHE: CPT

## 2022-02-18 PROCEDURE — 99214 OFFICE O/P EST MOD 30 MIN: CPT

## 2022-02-18 RX ORDER — ALBUTEROL SULFATE 90 UG/1
108 (90 BASE) AEROSOL, METERED RESPIRATORY (INHALATION)
Qty: 1 | Refills: 1 | Status: DISCONTINUED | COMMUNITY
Start: 2021-07-20 | End: 2022-02-18

## 2022-02-18 RX ORDER — LEVOTHYROXINE SODIUM 88 UG/1
88 TABLET ORAL
Refills: 0 | Status: DISCONTINUED | COMMUNITY
End: 2022-02-18

## 2022-02-18 RX ORDER — LEVOTHYROXINE SODIUM 0.11 MG/1
112 TABLET ORAL DAILY
Qty: 30 | Refills: 2 | Status: DISCONTINUED | COMMUNITY
Start: 2020-03-18 | End: 2022-02-18

## 2022-03-01 ENCOUNTER — APPOINTMENT (OUTPATIENT)
Dept: INTERNAL MEDICINE | Facility: CLINIC | Age: 42
End: 2022-03-01
Payer: COMMERCIAL

## 2022-03-01 LAB
ALBUMIN SERPL ELPH-MCNC: 5.4 G/DL
ALP BLD-CCNC: 86 U/L
ALT SERPL-CCNC: 52 U/L
ANION GAP SERPL CALC-SCNC: 16 MMOL/L
AST SERPL-CCNC: 34 U/L
BASOPHILS # BLD AUTO: 0.02 K/UL
BASOPHILS NFR BLD AUTO: 0.4 %
BILIRUB SERPL-MCNC: 0.3 MG/DL
BUN SERPL-MCNC: 13 MG/DL
CALCIUM SERPL-MCNC: 10.4 MG/DL
CHLORIDE SERPL-SCNC: 101 MMOL/L
CO2 SERPL-SCNC: 25 MMOL/L
CREAT SERPL-MCNC: 0.97 MG/DL
EOSINOPHIL # BLD AUTO: 0.08 K/UL
EOSINOPHIL NFR BLD AUTO: 1.6 %
HCG SERPL-MCNC: 4 MIU/ML
HCG UR QL: NEGATIVE
HCT VFR BLD CALC: 41.7 %
HGB BLD-MCNC: 13.5 G/DL
IMM GRANULOCYTES NFR BLD AUTO: 0.4 %
IRON SATN MFR SERPL: 23 %
IRON SERPL-MCNC: 83 UG/DL
LYMPHOCYTES # BLD AUTO: 2.07 K/UL
LYMPHOCYTES NFR BLD AUTO: 41.3 %
MAN DIFF?: NORMAL
MCHC RBC-ENTMCNC: 31.8 PG
MCHC RBC-ENTMCNC: 32.4 GM/DL
MCV RBC AUTO: 98.1 FL
MONOCYTES # BLD AUTO: 0.42 K/UL
MONOCYTES NFR BLD AUTO: 8.4 %
NEUTROPHILS # BLD AUTO: 2.4 K/UL
NEUTROPHILS NFR BLD AUTO: 47.9 %
PLATELET # BLD AUTO: 199 K/UL
POTASSIUM SERPL-SCNC: 4 MMOL/L
PROT SERPL-MCNC: 7.7 G/DL
RBC # BLD: 4.25 M/UL
RBC # FLD: 14.5 %
SODIUM SERPL-SCNC: 142 MMOL/L
TIBC SERPL-MCNC: 355 UG/DL
TSH SERPL-ACNC: 45.1 UIU/ML
UIBC SERPL-MCNC: 273 UG/DL
WBC # FLD AUTO: 5.01 K/UL

## 2022-03-01 PROCEDURE — 99214 OFFICE O/P EST MOD 30 MIN: CPT | Mod: 95

## 2022-03-04 ENCOUNTER — APPOINTMENT (OUTPATIENT)
Dept: ULTRASOUND IMAGING | Facility: CLINIC | Age: 42
End: 2022-03-04
Payer: COMMERCIAL

## 2022-03-04 ENCOUNTER — OUTPATIENT (OUTPATIENT)
Dept: OUTPATIENT SERVICES | Facility: HOSPITAL | Age: 42
LOS: 1 days | End: 2022-03-04
Payer: COMMERCIAL

## 2022-03-04 DIAGNOSIS — E28.39 OTHER PRIMARY OVARIAN FAILURE: ICD-10-CM

## 2022-03-04 DIAGNOSIS — Q96.3 MOSAICISM, 45, X/46, XX OR XY: ICD-10-CM

## 2022-03-04 DIAGNOSIS — R53.83 OTHER FATIGUE: ICD-10-CM

## 2022-03-04 PROCEDURE — 76700 US EXAM ABDOM COMPLETE: CPT

## 2022-03-04 PROCEDURE — 76856 US EXAM PELVIC COMPLETE: CPT

## 2022-03-04 PROCEDURE — 76700 US EXAM ABDOM COMPLETE: CPT | Mod: 26

## 2022-03-04 PROCEDURE — 76856 US EXAM PELVIC COMPLETE: CPT | Mod: 26

## 2022-03-08 ENCOUNTER — APPOINTMENT (OUTPATIENT)
Dept: OBGYN | Facility: CLINIC | Age: 42
End: 2022-03-08
Payer: COMMERCIAL

## 2022-03-08 VITALS
HEART RATE: 67 BPM | BODY MASS INDEX: 23.08 KG/M2 | HEIGHT: 57 IN | DIASTOLIC BLOOD PRESSURE: 67 MMHG | SYSTOLIC BLOOD PRESSURE: 101 MMHG | WEIGHT: 107 LBS

## 2022-03-08 PROCEDURE — 99072 ADDL SUPL MATRL&STAF TM PHE: CPT

## 2022-03-08 PROCEDURE — 36415 COLL VENOUS BLD VENIPUNCTURE: CPT

## 2022-03-08 PROCEDURE — 99213 OFFICE O/P EST LOW 20 MIN: CPT

## 2022-03-08 NOTE — PLAN
[FreeTextEntry1] : 41 year old female to discuss her recent  imaging and restarting hormone replacement therapy.\par \par -Explained to her that her imaging was normal\par -blood drawn for testing

## 2022-03-08 NOTE — HISTORY OF PRESENT ILLNESS
[FreeTextEntry1] : 41 year old female presents to discuss her recent pelvic imaging and restarting hormone replacement therapy. She stopped it last year due to masses found on her breast. She had a recent pelvic sono which was normal. Pt is taking HRT for POF.

## 2022-03-10 LAB
25(OH)D3 SERPL-MCNC: 89.7 NG/ML
ESTRADIOL SERPL-MCNC: <5 PG/ML
FSH SERPL-MCNC: 119 IU/L
LH SERPL-ACNC: 51.2 IU/L
PROLACTIN SERPL-MCNC: 11 NG/ML
TSH SERPL-ACNC: 8.95 UIU/ML

## 2022-03-15 LAB
TESTOST FREE SERPL-MCNC: 0.5 PG/ML
TESTOST SERPL-MCNC: <2.5 NG/DL

## 2022-03-18 LAB — DHEA-SULFATE, SERUM: 95 UG/DL

## 2022-03-21 LAB — 17OHP SERPL-MCNC: 14 NG/DL

## 2022-04-11 PROBLEM — Z11.59 SCREENING FOR VIRAL DISEASE: Status: ACTIVE | Noted: 2021-05-21

## 2022-06-02 ENCOUNTER — APPOINTMENT (OUTPATIENT)
Dept: OBGYN | Facility: CLINIC | Age: 42
End: 2022-06-02
Payer: COMMERCIAL

## 2022-06-02 PROCEDURE — 99072 ADDL SUPL MATRL&STAF TM PHE: CPT

## 2022-06-02 PROCEDURE — 36415 COLL VENOUS BLD VENIPUNCTURE: CPT

## 2022-06-03 LAB — HCG SERPL-MCNC: 6 MIU/ML

## 2022-06-16 ENCOUNTER — APPOINTMENT (OUTPATIENT)
Dept: OBGYN | Facility: CLINIC | Age: 42
End: 2022-06-16
Payer: COMMERCIAL

## 2022-06-16 VITALS
WEIGHT: 106 LBS | OXYGEN SATURATION: 99 % | RESPIRATION RATE: 16 BRPM | DIASTOLIC BLOOD PRESSURE: 81 MMHG | HEIGHT: 57 IN | BODY MASS INDEX: 22.87 KG/M2 | HEART RATE: 69 BPM | SYSTOLIC BLOOD PRESSURE: 117 MMHG

## 2022-06-16 PROCEDURE — 99072 ADDL SUPL MATRL&STAF TM PHE: CPT

## 2022-06-16 PROCEDURE — 36415 COLL VENOUS BLD VENIPUNCTURE: CPT

## 2022-06-16 PROCEDURE — 99213 OFFICE O/P EST LOW 20 MIN: CPT

## 2022-06-16 NOTE — END OF VISIT
[FreeTextEntry3] : I, Paris Perez, acted solely as a scribe for Dr. Ines Smith MD., on 06/16/2022.\par \par All medical record entries made by the scribe were at my, Dr. Ines Smith MD., direction and personally dictated by me on 06/16/2022. I have personally reviewed the chart and agree that the record accurately reflects my personal performance of the history, physical exam, assessment and plan.\par

## 2022-06-16 NOTE — PLAN
[FreeTextEntry1] : ETHAN HOWELLPAT is a 42 year old presenting for follow up\par -beta HCG done today \par -continue hormone medication

## 2022-06-16 NOTE — HISTORY OF PRESENT ILLNESS
[Patient reported mammogram was normal] : Patient reported mammogram was normal [Patient reported breast sonogram was normal] : Patient reported breast sonogram was normal [Patient reported PAP Smear was normal] : Patient reported PAP Smear was normal [Patient reported bone density results were normal] : Patient reported bone density results were normal [FreeTextEntry1] : ETHAN MORALES is a 42 year old presenting for follow up. Pt restarted medication and is here for a check up. reports she is doing well and has no complaints  \par  [Mammogramdate] : 6/23/21 [BreastSonogramDate] : 6/23/21 [PapSmeardate] : 5/29/20 [BoneDensityDate] : 1/30/20

## 2022-06-17 LAB
ESTRADIOL SERPL-MCNC: <5 PG/ML
FSH SERPL-MCNC: 105 IU/L
LH SERPL-ACNC: 58.3 IU/L
TSH SERPL-ACNC: 1.64 UIU/ML

## 2022-06-22 ENCOUNTER — APPOINTMENT (OUTPATIENT)
Dept: OBGYN | Facility: CLINIC | Age: 42
End: 2022-06-22

## 2022-06-22 LAB
TESTOST FREE SERPL-MCNC: 0.3 PG/ML
TESTOST SERPL-MCNC: 29.3 NG/DL

## 2022-06-27 LAB
17OHP SERPL-MCNC: 27 NG/DL
DHEA-SULFATE, SERUM: 74 UG/DL

## 2022-07-22 ENCOUNTER — APPOINTMENT (OUTPATIENT)
Dept: INTERNAL MEDICINE | Facility: CLINIC | Age: 42
End: 2022-07-22

## 2022-08-08 ENCOUNTER — APPOINTMENT (OUTPATIENT)
Dept: ENDOCRINOLOGY | Facility: CLINIC | Age: 42
End: 2022-08-08

## 2022-08-08 VITALS
SYSTOLIC BLOOD PRESSURE: 98 MMHG | TEMPERATURE: 97 F | WEIGHT: 102 LBS | OXYGEN SATURATION: 98 % | HEART RATE: 58 BPM | BODY MASS INDEX: 22.01 KG/M2 | RESPIRATION RATE: 16 BRPM | DIASTOLIC BLOOD PRESSURE: 62 MMHG | HEIGHT: 57 IN

## 2022-08-08 PROCEDURE — 99072 ADDL SUPL MATRL&STAF TM PHE: CPT

## 2022-08-08 PROCEDURE — 99214 OFFICE O/P EST MOD 30 MIN: CPT

## 2022-08-09 LAB
25(OH)D3 SERPL-MCNC: 64.3 NG/ML
ANION GAP SERPL CALC-SCNC: 12 MMOL/L
BUN SERPL-MCNC: 10 MG/DL
CALCIUM SERPL-MCNC: 10.3 MG/DL
CALCIUM SERPL-MCNC: 10.3 MG/DL
CHLORIDE SERPL-SCNC: 104 MMOL/L
CO2 SERPL-SCNC: 26 MMOL/L
CREAT SERPL-MCNC: 0.76 MG/DL
EGFR: 99 ML/MIN/1.73M2
GLUCOSE SERPL-MCNC: 83 MG/DL
PARATHYROID HORMONE INTACT: 50 PG/ML
PHOSPHATE SERPL-MCNC: 3.9 MG/DL
POTASSIUM SERPL-SCNC: 4.1 MMOL/L
SODIUM SERPL-SCNC: 142 MMOL/L
TSH SERPL-ACNC: 1.94 UIU/ML

## 2022-08-22 LAB — ADRENAL AB SER-ACNC: NEGATIVE

## 2022-09-23 ENCOUNTER — APPOINTMENT (OUTPATIENT)
Dept: DERMATOLOGY | Facility: CLINIC | Age: 42
End: 2022-09-23

## 2022-09-23 DIAGNOSIS — L24.9 IRRITANT CONTACT DERMATITIS, UNSPECIFIED CAUSE: ICD-10-CM

## 2022-09-23 DIAGNOSIS — L85.3 XEROSIS CUTIS: ICD-10-CM

## 2022-09-23 DIAGNOSIS — H00.014 HORDEOLUM EXTERNUM LEFT UPPER EYELID: ICD-10-CM

## 2022-09-23 DIAGNOSIS — L85.1 ACQUIRED KERATOSIS [KERATODERMA] PALMARIS ET PLANTARIS: ICD-10-CM

## 2022-09-23 DIAGNOSIS — D23.30 OTHER BENIGN NEOPLASM OF SKIN OF UNSPECIFIED PART OF FACE: ICD-10-CM

## 2022-09-23 PROCEDURE — 99072 ADDL SUPL MATRL&STAF TM PHE: CPT

## 2022-09-23 PROCEDURE — 99204 OFFICE O/P NEW MOD 45 MIN: CPT

## 2022-09-27 ENCOUNTER — APPOINTMENT (OUTPATIENT)
Dept: DERMATOLOGY | Facility: CLINIC | Age: 42
End: 2022-09-27

## 2022-10-26 ENCOUNTER — APPOINTMENT (OUTPATIENT)
Dept: OBGYN | Facility: CLINIC | Age: 42
End: 2022-10-26

## 2022-10-26 VITALS
HEIGHT: 57 IN | BODY MASS INDEX: 22.87 KG/M2 | SYSTOLIC BLOOD PRESSURE: 126 MMHG | DIASTOLIC BLOOD PRESSURE: 80 MMHG | WEIGHT: 106 LBS

## 2022-10-26 DIAGNOSIS — R10.2 PELVIC AND PERINEAL PAIN: ICD-10-CM

## 2022-10-26 PROCEDURE — 99213 OFFICE O/P EST LOW 20 MIN: CPT

## 2022-10-26 PROCEDURE — 99072 ADDL SUPL MATRL&STAF TM PHE: CPT

## 2022-10-26 PROCEDURE — 36415 COLL VENOUS BLD VENIPUNCTURE: CPT

## 2022-10-26 NOTE — PLAN
[FreeTextEntry1] : 41 yo , discomfort in abd.\par \par Abd discomfort\par -rx given for pelvic sono\par \par HCM\par -f/u homrone levels\par -continue current hormone therapy\par -rto for annual\par

## 2022-10-26 NOTE — PHYSICAL EXAM
[Soft] : soft [Non-tender] : non-tender [Non-distended] : non-distended [No HSM] : No HSM [No Lesions] : no lesions [No Mass] : no mass [Examination Of The Breasts] : a normal appearance [No Masses] : no breast masses were palpable [Labia Majora] : normal [Labia Minora] : normal [Normal] : normal [Uterine Adnexae] : normal

## 2022-10-26 NOTE — HISTORY OF PRESENT ILLNESS
[Patient reported PAP Smear was normal] : Patient reported PAP Smear was normal [FreeTextEntry1] : 41 yo  presents for f/u regarding discomfort in abd. Pt is currently taking HRT \par \par GYNHx: premature ovarian failure\par PMHx: hypothyroid\par FHx: diabetes (MGM)\par \par \par \par Pt presents for bloating and cramping possibly due to hormone [Mammogramdate] : 6/2021 [TextBox_19] : BIRADS 3 [PapSmeardate] : 5/2020 [BoneDensityDate] : 1/2020 [TextBox_37] : osteoporosis? [LMPDate] : on hormones

## 2022-10-26 NOTE — END OF VISIT
[FreeTextEntry3] : I, Lucía Holden, acted as a scribe on behalf of Dr. Ines Smith on 10/26/2022. \par \par All medical entries made by the scribe where at my, Dr. Manuela Patricio, direction and personally dictated by me on 10/26/2022. I have reviewed the chart and agree that the record accurately reflects my personal performance of the history, physical exam, assessment, and plan. I have also personally directed, reviewed and agreed with the chart.\par

## 2022-10-27 ENCOUNTER — APPOINTMENT (OUTPATIENT)
Dept: OPHTHALMOLOGY | Facility: CLINIC | Age: 42
End: 2022-10-27

## 2022-10-27 ENCOUNTER — NON-APPOINTMENT (OUTPATIENT)
Age: 42
End: 2022-10-27

## 2022-10-27 PROCEDURE — 92250 FUNDUS PHOTOGRAPHY W/I&R: CPT

## 2022-10-27 PROCEDURE — 99072 ADDL SUPL MATRL&STAF TM PHE: CPT

## 2022-10-27 PROCEDURE — 92014 COMPRE OPH EXAM EST PT 1/>: CPT

## 2022-11-03 ENCOUNTER — APPOINTMENT (OUTPATIENT)
Dept: ULTRASOUND IMAGING | Facility: CLINIC | Age: 42
End: 2022-11-03

## 2022-11-03 ENCOUNTER — RX RENEWAL (OUTPATIENT)
Age: 42
End: 2022-11-03

## 2022-11-04 LAB
ESTRADIOL SERPL-MCNC: <5 PG/ML
FSH SERPL-MCNC: 129 IU/L
HCG SERPL-MCNC: 8 MIU/ML
LH SERPL-ACNC: 63.1 IU/L
TSH SERPL-ACNC: 1.63 UIU/ML

## 2022-11-28 ENCOUNTER — APPOINTMENT (OUTPATIENT)
Dept: OBGYN | Facility: CLINIC | Age: 42
End: 2022-11-28

## 2022-11-28 PROCEDURE — 99072 ADDL SUPL MATRL&STAF TM PHE: CPT

## 2022-11-28 PROCEDURE — 36415 COLL VENOUS BLD VENIPUNCTURE: CPT

## 2022-12-01 LAB — HCG SERPL-MCNC: 7 MIU/ML

## 2022-12-02 ENCOUNTER — APPOINTMENT (OUTPATIENT)
Dept: OBGYN | Facility: CLINIC | Age: 42
End: 2022-12-02

## 2022-12-02 VITALS
HEART RATE: 66 BPM | WEIGHT: 106 LBS | BODY MASS INDEX: 22.87 KG/M2 | SYSTOLIC BLOOD PRESSURE: 116 MMHG | HEIGHT: 57 IN | DIASTOLIC BLOOD PRESSURE: 79 MMHG

## 2022-12-02 DIAGNOSIS — Z11.51 ENCOUNTER FOR SCREENING FOR HUMAN PAPILLOMAVIRUS (HPV): ICD-10-CM

## 2022-12-02 DIAGNOSIS — Z32.01 ENCOUNTER FOR PREGNANCY TEST, RESULT POSITIVE: ICD-10-CM

## 2022-12-02 DIAGNOSIS — Z12.39 ENCOUNTER FOR OTHER SCREENING FOR MALIGNANT NEOPLASM OF BREAST: ICD-10-CM

## 2022-12-02 DIAGNOSIS — Z01.411 ENCOUNTER FOR GYNECOLOGICAL EXAMINATION (GENERAL) (ROUTINE) WITH ABNORMAL FINDINGS: ICD-10-CM

## 2022-12-02 DIAGNOSIS — Z12.4 ENCOUNTER FOR SCREENING FOR MALIGNANT NEOPLASM OF CERVIX: ICD-10-CM

## 2022-12-02 PROCEDURE — 99072 ADDL SUPL MATRL&STAF TM PHE: CPT

## 2022-12-02 PROCEDURE — 36415 COLL VENOUS BLD VENIPUNCTURE: CPT

## 2022-12-02 PROCEDURE — 99396 PREV VISIT EST AGE 40-64: CPT

## 2022-12-02 NOTE — PLAN
[FreeTextEntry1] : Routine GYN Exam\par -Discussed and reviewed importance of monthly BSE\par -Declines STI testing, importance safe sexual practices discussed\par -Pap/HPV test collected and sent at todays visit\par -RX mammo/sono given to pt with locations and instructions-\par Osteoporosis prevention; recc. vitd/Calcium supplementation and WBE to maintain bone density; erx DEXA\par -f/u PCP for recommended HCM, vaccinations and CA screening\par -f/u endo\par -rpt bhcg done today; has rx for pelvic sono\par \par RTO 1 yr or sooner if needed\par

## 2022-12-02 NOTE — COUNSELING
[Nutrition/ Exercise/ Weight Management] : nutrition, exercise, weight management [Vitamins/Supplements] : vitamins/supplements [Breast Self Exam] : breast self exam [Contraception/ Emergency Contraception/ Safe Sexual Practices] : contraception, emergency contraception, safe sexual practices [Confidentiality] : confidentiality [STD (testing, results, tx)] : STD (testing, results, tx) [Lab Results] : lab results [Medication Management] : medication management

## 2022-12-02 NOTE — HISTORY OF PRESENT ILLNESS
[FreeTextEntry1] : Demographics:  Race: Patient declined information Ethnicity:  or  Native Language: Korean Occupation: Worship  \par : 0  Para:  0 0 0 0 \par \par Chief Complaint: Amenorrhea \par HPI: 41 y/o g0 Pt presents for annual GYN exam with concerns over a recent pap done in . Spoke with her sister about wanting to conceive and was told to see a fertility doctor in [place] and they saw "inflammation" on her pap smear. This was 2 weeks ago.  No PMB, abnormal discharge or pelvic pain. Occasional vaginal dryness using preseed lubricant and coconut oil. \par \par [place] in [place]; NILM, HPV neg. [place] [facility] [place]. \par DEXA : 2020 osteoporosis\par \par Stopped lauren dot 2020; was going to persue fertility treatments/also worried about dense breasts; [facility]; she is no longer at that fertility center. \par  \par Seeing Dr. Patrick (endo) for hypothyroid\par OB History: No significant OB history. \par GYN premature ovarian failure \par Menarche Age: 10 Sexually Active  \par PMH: hypothyroid, Turners Syndrome, POF \par Surgical History: No significant surgical history. \par Allergies: none reported [Mammogramdate] : 6/2021 [TextBox_19] : BIRADS3 [BreastSonogramDate] : 6/2021 [TextBox_25] : BIRAD 3 [PapSmeardate] : 6/2/2020 [TextBox_31] : NILM, hpv neg [BoneDensityDate] : 1/2020 [TextBox_37] : osteoporosis

## 2022-12-04 LAB — HPV HIGH+LOW RISK DNA PNL CVX: NOT DETECTED

## 2022-12-05 LAB — HCG SERPL-MCNC: 7 MIU/ML

## 2022-12-06 ENCOUNTER — APPOINTMENT (OUTPATIENT)
Dept: ULTRASOUND IMAGING | Facility: CLINIC | Age: 42
End: 2022-12-06

## 2022-12-06 PROCEDURE — 76856 US EXAM PELVIC COMPLETE: CPT

## 2022-12-06 PROCEDURE — 76830 TRANSVAGINAL US NON-OB: CPT

## 2022-12-14 ENCOUNTER — APPOINTMENT (OUTPATIENT)
Dept: ULTRASOUND IMAGING | Facility: CLINIC | Age: 42
End: 2022-12-14

## 2022-12-22 ENCOUNTER — NON-APPOINTMENT (OUTPATIENT)
Age: 42
End: 2022-12-22

## 2023-01-20 ENCOUNTER — APPOINTMENT (OUTPATIENT)
Dept: RADIOLOGY | Facility: CLINIC | Age: 43
End: 2023-01-20
Payer: COMMERCIAL

## 2023-01-20 ENCOUNTER — APPOINTMENT (OUTPATIENT)
Dept: ULTRASOUND IMAGING | Facility: CLINIC | Age: 43
End: 2023-01-20
Payer: COMMERCIAL

## 2023-01-20 ENCOUNTER — APPOINTMENT (OUTPATIENT)
Dept: MAMMOGRAPHY | Facility: CLINIC | Age: 43
End: 2023-01-20
Payer: COMMERCIAL

## 2023-01-20 ENCOUNTER — OUTPATIENT (OUTPATIENT)
Dept: OUTPATIENT SERVICES | Facility: HOSPITAL | Age: 43
LOS: 1 days | End: 2023-01-20
Payer: COMMERCIAL

## 2023-01-20 DIAGNOSIS — R92.2 INCONCLUSIVE MAMMOGRAM: ICD-10-CM

## 2023-01-20 DIAGNOSIS — Q96.3 MOSAICISM, 45, X/46, XX OR XY: ICD-10-CM

## 2023-01-20 DIAGNOSIS — E03.9 HYPOTHYROIDISM, UNSPECIFIED: ICD-10-CM

## 2023-01-20 DIAGNOSIS — Z12.39 ENCOUNTER FOR OTHER SCREENING FOR MALIGNANT NEOPLASM OF BREAST: ICD-10-CM

## 2023-01-20 PROCEDURE — 77080 DXA BONE DENSITY AXIAL: CPT | Mod: 26

## 2023-01-20 PROCEDURE — 76536 US EXAM OF HEAD AND NECK: CPT

## 2023-01-20 PROCEDURE — 76536 US EXAM OF HEAD AND NECK: CPT | Mod: 26

## 2023-01-20 PROCEDURE — 77080 DXA BONE DENSITY AXIAL: CPT

## 2023-01-24 ENCOUNTER — NON-APPOINTMENT (OUTPATIENT)
Age: 43
End: 2023-01-24

## 2023-01-25 ENCOUNTER — APPOINTMENT (OUTPATIENT)
Dept: ENDOCRINOLOGY | Facility: CLINIC | Age: 43
End: 2023-01-25
Payer: COMMERCIAL

## 2023-01-25 VITALS
OXYGEN SATURATION: 99 % | HEIGHT: 57 IN | BODY MASS INDEX: 24.16 KG/M2 | WEIGHT: 112 LBS | SYSTOLIC BLOOD PRESSURE: 100 MMHG | HEART RATE: 65 BPM | DIASTOLIC BLOOD PRESSURE: 65 MMHG | TEMPERATURE: 97.9 F

## 2023-01-25 PROCEDURE — 99072 ADDL SUPL MATRL&STAF TM PHE: CPT

## 2023-01-25 PROCEDURE — 99204 OFFICE O/P NEW MOD 45 MIN: CPT

## 2023-01-26 LAB
ESTIMATED AVERAGE GLUCOSE: 117 MG/DL
HBA1C MFR BLD HPLC: 5.7 %
T4 FREE SERPL-MCNC: 1.6 NG/DL
TSH SERPL-ACNC: 0.46 UIU/ML

## 2023-01-26 NOTE — HISTORY OF PRESENT ILLNESS
[FreeTextEntry1] : 42 year F referred for management of hypothyroidism, osteoporosis, POF, Alejandra mosiac. \par \par Hypothyroidism: \par Prior or current medication thyroid use: Yes, LT4 100mcg po daily \par Known family or personal hx of thyroid disease: Yes \par History of hemithyroidectomy/ thyroidectomy: No\par Goiter or hx of goiter : No\par Known Hx of autoimmune disease: No\par History of Radioactive iodine therapy/ Chest or Neck radiation therapy: No\par Fatigue: mild\par Weight gain without significant change in appetite: No\par Menstrual irregularities (menorrhagia), infertility: Yes \par Weakness, muscle cramps: No\par Hoarseness: No\par \par \par 42 year F here for assessment for osteoporosis \par \par Date last DEXA performed:  1/21/2023\par Site/Location where last DEXA was performed: [facility] [place] \par \par History of broken bone as an adult: No \par Premature menopause (<45 years of age):  Yes, turners \par History of Primary hypogonadism: Yes\par Corticosteroid Therapy: No/\par Tobacco use: No\par Alcohol use: No\par Maternal family history of hip fracture: No\par \par Currently on Vitamin D3 oral supplementation: Yes \par Currently on Calcium Oral supplementation: No/\par Almost daily consumption of dairy:  Yes \par \par History of prior/current prescribed anabolic/ antiresorptive therapy for osteoporosis: No\par \par \par History of other common conditions associated with osteoporosis :\par Malabsorption: No\par Hyperthyroidism: No\par Chronic Renal Failure: No\par Prolonged immobilization: No\par History of renal calculi/ elevated blood calcium: No\par History of autoimmune disease: No\par \par Premature ovarian failure\par \par Patient has been taking bioidentical estrogen cream, managed by GYN. She has been followed closely with them\par \par Patient had been on patch in past however reports she grew tired of it and was switched to bioidentical cream which she prefers and has been using for years. \par \par No hot flashes, reports some fatigue\par \par \par \par \par

## 2023-01-26 NOTE — ADDENDUM
[FreeTextEntry1] : 01/26/2023  3:20 PM \par Contacted Ms. ETHAN MORALES  at telephone number listed on file to review results of labs done on 1/25/2023. Patient agrees to review over phone\par \par TFTs: at target: c/w LT4 100mcg po daily \par \par A1c: 5.7%, advised diet/ exercise\par \par \par f/up 5 mths with labs 1 week prior \par

## 2023-01-26 NOTE — PHYSICAL EXAM
[Alert] : alert [No Acute Distress] : no acute distress [Normal Sclera/Conjunctiva] : normal sclera/conjunctiva [EOMI] : extra ocular movement intact [No Proptosis] : no proptosis [Normal Oropharynx] : the oropharynx was normal [Thyroid Not Enlarged] : the thyroid was not enlarged [No Thyroid Nodules] : no palpable thyroid nodules [No Respiratory Distress] : no respiratory distress [No Accessory Muscle Use] : no accessory muscle use [Clear to Auscultation] : lungs were clear to auscultation bilaterally [Normal S1, S2] : normal S1 and S2 [Normal Rate] : heart rate was normal [Regular Rhythm] : with a regular rhythm [No Edema] : no peripheral edema [Pedal Pulses Normal] : the pedal pulses are present [Normal Bowel Sounds] : normal bowel sounds [Not Tender] : non-tender [Not Distended] : not distended [Soft] : abdomen soft [Normal Anterior Cervical Nodes] : no anterior cervical lymphadenopathy [Normal Posterior Cervical Nodes] : no posterior cervical lymphadenopathy [No Spinal Tenderness] : no spinal tenderness [Spine Straight] : spine straight [No Stigmata of Cushings Syndrome] : no stigmata of Cushings Syndrome [Normal Gait] : normal gait [Normal Strength/Tone] : muscle strength and tone were normal [No Rash] : no rash [Normal Reflexes] : deep tendon reflexes were 2+ and symmetric [No Tremors] : no tremors [Oriented x3] : oriented to person, place, and time [Acanthosis Nigricans] : no acanthosis nigricans

## 2023-02-02 ENCOUNTER — TRANSCRIPTION ENCOUNTER (OUTPATIENT)
Age: 43
End: 2023-02-02

## 2023-02-21 ENCOUNTER — APPOINTMENT (OUTPATIENT)
Dept: ENDOCRINOLOGY | Facility: CLINIC | Age: 43
End: 2023-02-21

## 2023-02-27 ENCOUNTER — APPOINTMENT (OUTPATIENT)
Dept: ENDOCRINOLOGY | Facility: CLINIC | Age: 43
End: 2023-02-27

## 2023-03-17 ENCOUNTER — APPOINTMENT (OUTPATIENT)
Dept: CARDIOLOGY | Facility: CLINIC | Age: 43
End: 2023-03-17

## 2023-04-06 ENCOUNTER — APPOINTMENT (OUTPATIENT)
Dept: OBGYN | Facility: CLINIC | Age: 43
End: 2023-04-06

## 2023-05-02 ENCOUNTER — APPOINTMENT (OUTPATIENT)
Dept: INTERNAL MEDICINE | Facility: CLINIC | Age: 43
End: 2023-05-02
Payer: COMMERCIAL

## 2023-05-02 DIAGNOSIS — R12 HEARTBURN: ICD-10-CM

## 2023-05-02 PROCEDURE — 99213 OFFICE O/P EST LOW 20 MIN: CPT | Mod: 95

## 2023-05-04 ENCOUNTER — APPOINTMENT (OUTPATIENT)
Dept: HUMAN REPRODUCTION | Facility: CLINIC | Age: 43
End: 2023-05-04
Payer: COMMERCIAL

## 2023-05-04 PROCEDURE — 36415 COLL VENOUS BLD VENIPUNCTURE: CPT

## 2023-05-04 PROCEDURE — 76857 US EXAM PELVIC LIMITED: CPT

## 2023-05-04 PROCEDURE — 99072 ADDL SUPL MATRL&STAF TM PHE: CPT

## 2023-06-02 ENCOUNTER — APPOINTMENT (OUTPATIENT)
Dept: HUMAN REPRODUCTION | Facility: CLINIC | Age: 43
End: 2023-06-02

## 2023-06-09 ENCOUNTER — APPOINTMENT (OUTPATIENT)
Dept: INTERNAL MEDICINE | Facility: CLINIC | Age: 43
End: 2023-06-09

## 2023-06-23 ENCOUNTER — APPOINTMENT (OUTPATIENT)
Dept: ENDOCRINOLOGY | Facility: CLINIC | Age: 43
End: 2023-06-23
Payer: COMMERCIAL

## 2023-06-23 VITALS
HEART RATE: 78 BPM | BODY MASS INDEX: 23.35 KG/M2 | WEIGHT: 108.25 LBS | DIASTOLIC BLOOD PRESSURE: 76 MMHG | HEIGHT: 57 IN | OXYGEN SATURATION: 99 % | TEMPERATURE: 97.3 F | SYSTOLIC BLOOD PRESSURE: 102 MMHG

## 2023-06-23 PROCEDURE — 99214 OFFICE O/P EST MOD 30 MIN: CPT

## 2023-06-23 NOTE — PHYSICAL EXAM
[Alert] : alert [No Acute Distress] : no acute distress [Normal Sclera/Conjunctiva] : normal sclera/conjunctiva [EOMI] : extra ocular movement intact [No Proptosis] : no proptosis [Normal Oropharynx] : the oropharynx was normal [Thyroid Not Enlarged] : the thyroid was not enlarged [No Thyroid Nodules] : no palpable thyroid nodules [No Respiratory Distress] : no respiratory distress [No Accessory Muscle Use] : no accessory muscle use [Clear to Auscultation] : lungs were clear to auscultation bilaterally [Normal S1, S2] : normal S1 and S2 [Normal Rate] : heart rate was normal [Regular Rhythm] : with a regular rhythm [No Edema] : no peripheral edema [Pedal Pulses Normal] : the pedal pulses are present [Normal Bowel Sounds] : normal bowel sounds [Not Tender] : non-tender [Not Distended] : not distended [Soft] : abdomen soft [Normal Anterior Cervical Nodes] : no anterior cervical lymphadenopathy [No Spinal Tenderness] : no spinal tenderness [Spine Straight] : spine straight [No Stigmata of Cushings Syndrome] : no stigmata of Cushings Syndrome [Normal Gait] : normal gait [Normal Strength/Tone] : muscle strength and tone were normal [No Rash] : no rash [Acanthosis Nigricans] : no acanthosis nigricans [Normal Reflexes] : deep tendon reflexes were 2+ and symmetric [No Tremors] : no tremors [Oriented x3] : oriented to person, place, and time

## 2023-06-23 NOTE — HISTORY OF PRESENT ILLNESS
[FreeTextEntry1] : 42 year F referred for management of hypothyroidism, osteoporosis, POF, Alejandra mosiac. \par \par Hypothyroidism: \par Prior or current medication thyroid use: Yes, LT4 100mcg po daily \par Known family or personal hx of thyroid disease: Yes \par History of hemithyroidectomy/ thyroidectomy: No\par Goiter or hx of goiter : No\par Known Hx of autoimmune disease: No\par History of Radioactive iodine therapy/ Chest or Neck radiation therapy: No\par \par \par Menstrual irregularities (menorrhagia), infertility: Yes, sees fertility specialist \par \par \par \par 42 year F here for assessment for osteoporosis \par \par Date last DEXA performed:  1/21/2023\par Site/Location where last DEXA was performed: [facility] at [place] \par \par History of broken bone as an adult: No \par Premature menopause (<45 years of age):  Yes, turners \par History of Primary hypogonadism: Yes\par Corticosteroid Therapy: No/\par Tobacco use: No\par Alcohol use: No\par Maternal family history of hip fracture: No\par \par Currently on Vitamin D3 oral supplementation: Yes \par Almost daily consumption of dairy:  Yes \par \par History of prior/current prescribed anabolic/ antiresorptive therapy for osteoporosis: No\par \par \par History of other common conditions associated with osteoporosis :\par Malabsorption: No\par Hyperthyroidism: No\par Chronic Renal Failure: No\par Prolonged immobilization: No\par History of renal calculi/ elevated blood calcium: No\par History of autoimmune disease: No\par \par \par Premature ovarian failure\par \par Patient had been taking bioidentical estrogen cream, managed by GYN. She has been followed closely with them\par \par Patient had been on patch in past however reports she grew tired of it and was switched to bioidentical cream which she prefers and has been using for years. \par \par Currently prescribed lo-estrin, and is in fertility discussion with them \par \par \par \par \par

## 2023-07-05 ENCOUNTER — NON-APPOINTMENT (OUTPATIENT)
Age: 43
End: 2023-07-05

## 2023-07-05 LAB
ESTIMATED AVERAGE GLUCOSE: 120 MG/DL
HBA1C MFR BLD HPLC: 5.8 %
T4 FREE SERPL-MCNC: 1.5 NG/DL
TSH SERPL-ACNC: 6.03 UIU/ML

## 2023-07-20 ENCOUNTER — APPOINTMENT (OUTPATIENT)
Dept: INTERNAL MEDICINE | Facility: CLINIC | Age: 43
End: 2023-07-20
Payer: COMMERCIAL

## 2023-07-20 VITALS
DIASTOLIC BLOOD PRESSURE: 66 MMHG | BODY MASS INDEX: 22.44 KG/M2 | TEMPERATURE: 97.5 F | HEIGHT: 57 IN | SYSTOLIC BLOOD PRESSURE: 101 MMHG | OXYGEN SATURATION: 100 % | WEIGHT: 104 LBS | RESPIRATION RATE: 16 BRPM | HEART RATE: 65 BPM

## 2023-07-20 DIAGNOSIS — Z12.11 ENCOUNTER FOR SCREENING FOR MALIGNANT NEOPLASM OF COLON: ICD-10-CM

## 2023-07-20 PROCEDURE — 99396 PREV VISIT EST AGE 40-64: CPT

## 2023-07-20 RX ORDER — HYDROCORTISONE 1 %
12 CREAM (GRAM) TOPICAL
Qty: 1 | Refills: 6 | Status: DISCONTINUED | COMMUNITY
Start: 2022-09-23 | End: 2023-07-20

## 2023-07-21 ENCOUNTER — APPOINTMENT (OUTPATIENT)
Dept: CARDIOLOGY | Facility: CLINIC | Age: 43
End: 2023-07-21
Payer: COMMERCIAL

## 2023-07-21 ENCOUNTER — NON-APPOINTMENT (OUTPATIENT)
Age: 43
End: 2023-07-21

## 2023-07-21 VITALS
HEART RATE: 67 BPM | RESPIRATION RATE: 18 BRPM | OXYGEN SATURATION: 99 % | TEMPERATURE: 98.4 F | HEIGHT: 57 IN | SYSTOLIC BLOOD PRESSURE: 90 MMHG | DIASTOLIC BLOOD PRESSURE: 50 MMHG | BODY MASS INDEX: 22.44 KG/M2 | WEIGHT: 104 LBS

## 2023-07-21 DIAGNOSIS — D22.9 MELANOCYTIC NEVI, UNSPECIFIED: ICD-10-CM

## 2023-07-21 PROCEDURE — 99204 OFFICE O/P NEW MOD 45 MIN: CPT

## 2023-07-21 PROCEDURE — 93000 ELECTROCARDIOGRAM COMPLETE: CPT

## 2023-07-21 NOTE — PHYSICAL EXAM
[Well Developed] : well developed [Well Nourished] : well nourished [Normal Conjunctiva] : normal conjunctiva [No Acute Distress] : no acute distress [Normal Venous Pressure] : normal venous pressure [No Carotid Bruit] : no carotid bruit [Normal S1, S2] : normal S1, S2 [No Murmur] : no murmur [No Rub] : no rub [No Gallop] : no gallop [Clear Lung Fields] : clear lung fields [Good Air Entry] : good air entry [No Respiratory Distress] : no respiratory distress  [Soft] : abdomen soft [Non Tender] : non-tender [No Masses/organomegaly] : no masses/organomegaly [Normal Bowel Sounds] : normal bowel sounds [Normal Gait] : normal gait [No Edema] : no edema [No Cyanosis] : no cyanosis [No Clubbing] : no clubbing [No Varicosities] : no varicosities [No Rash] : no rash [No Skin Lesions] : no skin lesions [Moves all extremities] : moves all extremities [No Focal Deficits] : no focal deficits [Normal Speech] : normal speech [Alert and Oriented] : alert and oriented [Normal memory] : normal memory [de-identified] : nevus

## 2023-07-21 NOTE — HISTORY OF PRESENT ILLNESS
[FreeTextEntry1] : This is a 43 year female with a Pmhx of HLD hypothyroidism preDM  Alejandra mosiac who presents to the office as a new patient for cardiac care. pt reports feeling well she is trying to get pregnant and wants her heart to be checked \par \par Pt denies any CP, SOB, heart palpitations, dizziness, abdominal pain N/V/D fever or chills\par

## 2023-07-24 LAB
ALBUMIN SERPL ELPH-MCNC: 4.6 G/DL
ALP BLD-CCNC: 43 U/L
ALT SERPL-CCNC: 13 U/L
ANION GAP SERPL CALC-SCNC: 11 MMOL/L
AST SERPL-CCNC: 24 U/L
BILIRUB SERPL-MCNC: 0.2 MG/DL
BUN SERPL-MCNC: 7 MG/DL
CALCIUM SERPL-MCNC: 9.2 MG/DL
CHLORIDE SERPL-SCNC: 102 MMOL/L
CHOLEST SERPL-MCNC: 224 MG/DL
CO2 SERPL-SCNC: 22 MMOL/L
CREAT SERPL-MCNC: 0.77 MG/DL
EGFR: 97 ML/MIN/1.73M2
GLUCOSE SERPL-MCNC: 75 MG/DL
HDLC SERPL-MCNC: 71 MG/DL
LDLC SERPL CALC-MCNC: 140 MG/DL
NONHDLC SERPL-MCNC: 153 MG/DL
POTASSIUM SERPL-SCNC: 4.4 MMOL/L
PROT SERPL-MCNC: 6.7 G/DL
SODIUM SERPL-SCNC: 136 MMOL/L
TRIGL SERPL-MCNC: 76 MG/DL

## 2023-07-31 ENCOUNTER — APPOINTMENT (OUTPATIENT)
Dept: CT IMAGING | Facility: CLINIC | Age: 43
End: 2023-07-31
Payer: COMMERCIAL

## 2023-07-31 ENCOUNTER — OUTPATIENT (OUTPATIENT)
Dept: OUTPATIENT SERVICES | Facility: HOSPITAL | Age: 43
LOS: 1 days | End: 2023-07-31
Payer: COMMERCIAL

## 2023-07-31 DIAGNOSIS — E78.5 HYPERLIPIDEMIA, UNSPECIFIED: ICD-10-CM

## 2023-07-31 PROCEDURE — 75571 CT HRT W/O DYE W/CA TEST: CPT

## 2023-07-31 PROCEDURE — 75571 CT HRT W/O DYE W/CA TEST: CPT | Mod: 26

## 2023-08-11 ENCOUNTER — APPOINTMENT (OUTPATIENT)
Dept: CARDIOLOGY | Facility: CLINIC | Age: 43
End: 2023-08-11

## 2023-08-18 ENCOUNTER — RESULT REVIEW (OUTPATIENT)
Age: 43
End: 2023-08-18

## 2023-08-18 ENCOUNTER — APPOINTMENT (OUTPATIENT)
Dept: MAMMOGRAPHY | Facility: IMAGING CENTER | Age: 43
End: 2023-08-18

## 2023-08-18 ENCOUNTER — OUTPATIENT (OUTPATIENT)
Dept: OUTPATIENT SERVICES | Facility: HOSPITAL | Age: 43
LOS: 1 days | End: 2023-08-18
Payer: SELF-PAY

## 2023-08-18 ENCOUNTER — APPOINTMENT (OUTPATIENT)
Dept: ULTRASOUND IMAGING | Facility: IMAGING CENTER | Age: 43
End: 2023-08-18

## 2023-08-18 DIAGNOSIS — Z00.8 ENCOUNTER FOR OTHER GENERAL EXAMINATION: ICD-10-CM

## 2023-08-18 DIAGNOSIS — R92.2 INCONCLUSIVE MAMMOGRAM: ICD-10-CM

## 2023-08-18 PROCEDURE — 76641 ULTRASOUND BREAST COMPLETE: CPT | Mod: 26,50

## 2023-08-18 PROCEDURE — 76641 ULTRASOUND BREAST COMPLETE: CPT

## 2023-08-30 ENCOUNTER — APPOINTMENT (OUTPATIENT)
Dept: CARDIOLOGY | Facility: CLINIC | Age: 43
End: 2023-08-30
Payer: COMMERCIAL

## 2023-08-30 PROCEDURE — 93306 TTE W/DOPPLER COMPLETE: CPT

## 2023-09-14 PROBLEM — R92.2 DENSE BREAST: Status: ACTIVE | Noted: 2021-05-21

## 2023-09-15 ENCOUNTER — APPOINTMENT (OUTPATIENT)
Dept: SURGICAL ONCOLOGY | Facility: CLINIC | Age: 43
End: 2023-09-15
Payer: COMMERCIAL

## 2023-09-15 VITALS
SYSTOLIC BLOOD PRESSURE: 120 MMHG | WEIGHT: 102 LBS | HEART RATE: 63 BPM | HEIGHT: 57 IN | RESPIRATION RATE: 16 BRPM | DIASTOLIC BLOOD PRESSURE: 71 MMHG | OXYGEN SATURATION: 95 % | BODY MASS INDEX: 22.01 KG/M2

## 2023-09-15 DIAGNOSIS — R92.1 MAMMOGRAPHIC CALCIFICATION FOUND ON DIAGNOSTIC IMAGING OF BREAST: ICD-10-CM

## 2023-09-15 DIAGNOSIS — R92.2 INCONCLUSIVE MAMMOGRAM: ICD-10-CM

## 2023-09-15 PROCEDURE — 99214 OFFICE O/P EST MOD 30 MIN: CPT

## 2023-09-18 ENCOUNTER — APPOINTMENT (OUTPATIENT)
Dept: CARDIOLOGY | Facility: CLINIC | Age: 43
End: 2023-09-18

## 2023-10-04 ENCOUNTER — APPOINTMENT (OUTPATIENT)
Dept: CARDIOLOGY | Facility: CLINIC | Age: 43
End: 2023-10-04

## 2023-11-17 ENCOUNTER — APPOINTMENT (OUTPATIENT)
Dept: INTERNAL MEDICINE | Facility: CLINIC | Age: 43
End: 2023-11-17
Payer: COMMERCIAL

## 2023-11-17 VITALS
DIASTOLIC BLOOD PRESSURE: 82 MMHG | BODY MASS INDEX: 23.14 KG/M2 | SYSTOLIC BLOOD PRESSURE: 124 MMHG | RESPIRATION RATE: 16 BRPM | HEART RATE: 73 BPM | HEIGHT: 55 IN | TEMPERATURE: 98.3 F | WEIGHT: 100 LBS | OXYGEN SATURATION: 100 %

## 2023-11-17 DIAGNOSIS — R53.83 OTHER FATIGUE: ICD-10-CM

## 2023-11-17 DIAGNOSIS — Z86.79 PERSONAL HISTORY OF OTHER DISEASES OF THE CIRCULATORY SYSTEM: ICD-10-CM

## 2023-11-17 DIAGNOSIS — Z00.00 ENCOUNTER FOR GENERAL ADULT MEDICAL EXAMINATION W/OUT ABNORMAL FINDINGS: ICD-10-CM

## 2023-11-17 DIAGNOSIS — E78.5 HYPERLIPIDEMIA, UNSPECIFIED: ICD-10-CM

## 2023-11-17 DIAGNOSIS — R73.09 OTHER ABNORMAL GLUCOSE: ICD-10-CM

## 2023-11-17 PROCEDURE — 99396 PREV VISIT EST AGE 40-64: CPT

## 2023-11-17 RX ORDER — NORETHINDRONE ACETATE AND ETHINYL ESTRADIOL 1.5; 3 MG/1; UG/1
1.5-3 TABLET ORAL
Refills: 0 | Status: DISCONTINUED | COMMUNITY
End: 2023-11-17

## 2023-11-20 LAB
ALBUMIN SERPL ELPH-MCNC: 4.8 G/DL
ALP BLD-CCNC: 68 U/L
ALT SERPL-CCNC: 24 U/L
ANION GAP SERPL CALC-SCNC: 14 MMOL/L
AST SERPL-CCNC: 25 U/L
BILIRUB SERPL-MCNC: 0.3 MG/DL
BUN SERPL-MCNC: 12 MG/DL
CALCIUM SERPL-MCNC: 10 MG/DL
CHLORIDE SERPL-SCNC: 100 MMOL/L
CHOLEST SERPL-MCNC: 308 MG/DL
CO2 SERPL-SCNC: 25 MMOL/L
CREAT SERPL-MCNC: 0.73 MG/DL
EGFR: 103 ML/MIN/1.73M2
ESTIMATED AVERAGE GLUCOSE: 120 MG/DL
FERRITIN SERPL-MCNC: 46 NG/ML
GLUCOSE SERPL-MCNC: 68 MG/DL
HBA1C MFR BLD HPLC: 5.8 %
HCT VFR BLD CALC: 44.9 %
HDLC SERPL-MCNC: 107 MG/DL
HGB BLD-MCNC: 14.6 G/DL
IRON SATN MFR SERPL: 35 %
IRON SERPL-MCNC: 108 UG/DL
LDLC SERPL CALC-MCNC: 196 MG/DL
MCHC RBC-ENTMCNC: 31 PG
MCHC RBC-ENTMCNC: 32.5 GM/DL
MCV RBC AUTO: 95.3 FL
NONHDLC SERPL-MCNC: 202 MG/DL
PLATELET # BLD AUTO: 252 K/UL
POTASSIUM SERPL-SCNC: 4 MMOL/L
PROT SERPL-MCNC: 7.1 G/DL
RBC # BLD: 4.71 M/UL
RBC # FLD: 13.2 %
SODIUM SERPL-SCNC: 140 MMOL/L
TIBC SERPL-MCNC: 305 UG/DL
TRIGL SERPL-MCNC: 45 MG/DL
TSH SERPL-ACNC: 1.12 UIU/ML
UIBC SERPL-MCNC: 197 UG/DL
WBC # FLD AUTO: 5.16 K/UL

## 2023-11-21 ENCOUNTER — LABORATORY RESULT (OUTPATIENT)
Age: 43
End: 2023-11-21

## 2023-12-12 ENCOUNTER — APPOINTMENT (OUTPATIENT)
Dept: OBGYN | Facility: CLINIC | Age: 43
End: 2023-12-12
Payer: COMMERCIAL

## 2023-12-12 VITALS
SYSTOLIC BLOOD PRESSURE: 102 MMHG | DIASTOLIC BLOOD PRESSURE: 62 MMHG | WEIGHT: 101 LBS | BODY MASS INDEX: 21.79 KG/M2 | HEIGHT: 57 IN

## 2023-12-12 DIAGNOSIS — Z01.419 ENCOUNTER FOR GYNECOLOGICAL EXAMINATION (GENERAL) (ROUTINE) W/OUT ABNORMAL FINDINGS: ICD-10-CM

## 2023-12-12 PROCEDURE — 99396 PREV VISIT EST AGE 40-64: CPT

## 2023-12-12 PROCEDURE — 36415 COLL VENOUS BLD VENIPUNCTURE: CPT

## 2023-12-13 LAB
ESTRADIOL SERPL-MCNC: <5 PG/ML
FSH SERPL-MCNC: 124 IU/L
HPV HIGH+LOW RISK DNA PNL CVX: NOT DETECTED
LH SERPL-ACNC: 60.8 IU/L
TSH SERPL-ACNC: 0.76 UIU/ML

## 2023-12-15 LAB
CYTOLOGY CVX/VAG DOC THIN PREP: ABNORMAL
TESTOST FREE SERPL-MCNC: 0.3 PG/ML
TESTOST SERPL-MCNC: 3.3 NG/DL

## 2024-02-05 LAB
ESTIMATED AVERAGE GLUCOSE: 108 MG/DL
HBA1C MFR BLD HPLC: 5.4 %
T4 FREE SERPL-MCNC: 1.8 NG/DL
TSH SERPL-ACNC: 3.79 UIU/ML

## 2024-02-09 ENCOUNTER — APPOINTMENT (OUTPATIENT)
Dept: ENDOCRINOLOGY | Facility: CLINIC | Age: 44
End: 2024-02-09
Payer: COMMERCIAL

## 2024-02-09 VITALS
OXYGEN SATURATION: 99 % | HEIGHT: 57 IN | TEMPERATURE: 98.3 F | SYSTOLIC BLOOD PRESSURE: 106 MMHG | DIASTOLIC BLOOD PRESSURE: 70 MMHG | BODY MASS INDEX: 21.25 KG/M2 | HEART RATE: 69 BPM | WEIGHT: 98.5 LBS

## 2024-02-09 DIAGNOSIS — M81.0 AGE-RELATED OSTEOPOROSIS W/OUT CURRENT PATHOLOGICAL FRACTURE: ICD-10-CM

## 2024-02-09 DIAGNOSIS — E03.9 HYPOTHYROIDISM, UNSPECIFIED: ICD-10-CM

## 2024-02-09 DIAGNOSIS — Q96.3 MOSAICISM, 45, X/46, XX OR XY: ICD-10-CM

## 2024-02-09 DIAGNOSIS — E28.39 OTHER PRIMARY OVARIAN FAILURE: ICD-10-CM

## 2024-02-09 DIAGNOSIS — R73.03 PREDIABETES.: ICD-10-CM

## 2024-02-09 PROCEDURE — 99214 OFFICE O/P EST MOD 30 MIN: CPT

## 2024-02-09 NOTE — HISTORY OF PRESENT ILLNESS
[FreeTextEntry1] : 42 year F referred for management of hypothyroidism, osteoporosis, POF, Alejandra mosiac.   Hypothyroidism:  Prior or current medication thyroid use: Yes, LT4 100mcg po daily  Known family or personal hx of thyroid disease: Yes  History of hemithyroidectomy/ thyroidectomy: No Goiter or hx of goiter : No Known Hx of autoimmune disease: No History of Radioactive iodine therapy/ Chest or Neck radiation therapy: No   Menstrual irregularities (menorrhagia), infertility: Yes, sees fertility specialist    42 year F here for assessment for osteoporosis   Date last DEXA performed:  1/21/2023 Site/Location where last DEXA was performed: [facility] [place]   History of broken bone as an adult: No  Premature menopause (<45 years of age):  Yes, turners  History of Primary hypogonadism: Yes Corticosteroid Therapy: No/ Tobacco use: No Alcohol use: No Maternal family history of hip fracture: No  Currently on Vitamin D3 oral supplementation: Yes  Almost daily consumption of dairy:  Yes   History of prior/current prescribed anabolic/ antiresorptive therapy for osteoporosis: No   History of other common conditions associated with osteoporosis : Malabsorption: No Hyperthyroidism: No Chronic Renal Failure: No Prolonged immobilization: No History of renal calculi/ elevated blood calcium: No History of autoimmune disease: No   Premature ovarian failure  Patient had been taking bioidentical estrogen cream, managed by GYN. She has been followed closely with them  Patient had been on patch in past however reports she grew tired of it and was switched to bioidentical cream which she prefers and has been using for years.   Currently prescribed lo-estrin.

## 2024-02-09 NOTE — PHYSICAL EXAM
[Alert] : alert [No Acute Distress] : no acute distress [Normal Sclera/Conjunctiva] : normal sclera/conjunctiva [EOMI] : extra ocular movement intact [No Proptosis] : no proptosis [Normal Oropharynx] : the oropharynx was normal [Thyroid Not Enlarged] : the thyroid was not enlarged [No Thyroid Nodules] : no palpable thyroid nodules [No Respiratory Distress] : no respiratory distress [No Accessory Muscle Use] : no accessory muscle use [Clear to Auscultation] : lungs were clear to auscultation bilaterally [Normal S1, S2] : normal S1 and S2 [Normal Rate] : heart rate was normal [Regular Rhythm] : with a regular rhythm [No Edema] : no peripheral edema [Pedal Pulses Normal] : the pedal pulses are present [Normal Bowel Sounds] : normal bowel sounds [Not Tender] : non-tender [Not Distended] : not distended [Soft] : abdomen soft [Normal Anterior Cervical Nodes] : no anterior cervical lymphadenopathy [No Spinal Tenderness] : no spinal tenderness [Spine Straight] : spine straight [No Stigmata of Cushings Syndrome] : no stigmata of Cushings Syndrome [Normal Gait] : normal gait [Normal Strength/Tone] : muscle strength and tone were normal [No Rash] : no rash [Normal Reflexes] : deep tendon reflexes were 2+ and symmetric [No Tremors] : no tremors [Oriented x3] : oriented to person, place, and time [Acanthosis Nigricans] : no acanthosis nigricans

## 2024-02-12 RX ORDER — ATORVASTATIN CALCIUM 20 MG/1
20 TABLET, FILM COATED ORAL
Qty: 30 | Refills: 0 | Status: ACTIVE | COMMUNITY
Start: 2024-02-12 | End: 1900-01-01

## 2024-02-13 ENCOUNTER — RX RENEWAL (OUTPATIENT)
Age: 44
End: 2024-02-13

## 2024-02-15 ENCOUNTER — APPOINTMENT (OUTPATIENT)
Dept: ENDOCRINOLOGY | Facility: CLINIC | Age: 44
End: 2024-02-15

## 2024-03-12 LAB
ALBUMIN SERPL ELPH-MCNC: 5.2 G/DL
ALP BLD-CCNC: 78 U/L
ALT SERPL-CCNC: 17 U/L
AST SERPL-CCNC: 23 U/L
BILIRUB DIRECT SERPL-MCNC: 0.1 MG/DL
BILIRUB INDIRECT SERPL-MCNC: 0.2 MG/DL
BILIRUB SERPL-MCNC: 0.3 MG/DL
CHOLEST SERPL-MCNC: 316 MG/DL
CK SERPL-CCNC: 114 U/L
HDLC SERPL-MCNC: 117 MG/DL
LDLC SERPL CALC-MCNC: 190 MG/DL
NONHDLC SERPL-MCNC: 199 MG/DL
PROT SERPL-MCNC: 7.3 G/DL
TRIGL SERPL-MCNC: 66 MG/DL

## 2024-05-20 RX ORDER — LEVOTHYROXINE SODIUM 0.1 MG/1
100 TABLET ORAL
Qty: 90 | Refills: 0 | Status: ACTIVE | COMMUNITY
Start: 2022-11-03 | End: 1900-01-01

## 2024-07-26 ENCOUNTER — APPOINTMENT (OUTPATIENT)
Dept: ENDOCRINOLOGY | Facility: CLINIC | Age: 44
End: 2024-07-26
